# Patient Record
Sex: MALE | Race: WHITE | Employment: FULL TIME | URBAN - METROPOLITAN AREA
[De-identification: names, ages, dates, MRNs, and addresses within clinical notes are randomized per-mention and may not be internally consistent; named-entity substitution may affect disease eponyms.]

---

## 2017-10-11 ENCOUNTER — OFFICE VISIT (OUTPATIENT)
Dept: CARDIOLOGY | Age: 22
End: 2017-10-11
Payer: COMMERCIAL

## 2017-10-11 VITALS
BODY MASS INDEX: 23.4 KG/M2 | DIASTOLIC BLOOD PRESSURE: 77 MMHG | HEART RATE: 62 BPM | OXYGEN SATURATION: 98 % | HEIGHT: 77 IN | WEIGHT: 198.2 LBS | SYSTOLIC BLOOD PRESSURE: 125 MMHG

## 2017-10-11 DIAGNOSIS — I77.810 AORTIC ROOT DILATATION (HCC): ICD-10-CM

## 2017-10-11 DIAGNOSIS — Q87.40 MARFAN'S SYNDROME: Primary | ICD-10-CM

## 2017-10-11 PROCEDURE — 99243 OFF/OP CNSLTJ NEW/EST LOW 30: CPT | Performed by: INTERNAL MEDICINE

## 2017-10-11 PROCEDURE — 93000 ELECTROCARDIOGRAM COMPLETE: CPT | Performed by: INTERNAL MEDICINE

## 2017-10-11 RX ORDER — ATENOLOL 25 MG/1
25 TABLET ORAL DAILY
COMMUNITY
End: 2017-10-16 | Stop reason: SDUPTHER

## 2017-10-11 NOTE — PROGRESS NOTES
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 Had to change cardiologist because of insurance was seeing Dr. Penny Dodge in [facility] last seen him July 2016,echo was done there. EKG done today. Denies any chest pain,pressure,shortness of breath,palpitations,dizziness,lightheadedness,edema or fatigue. Wm Mcallister is 25 y.o. male who presents today for Evaluation and to establish care. Mr. Fly Cuenca has history of probable Marfan syndrome and mild aortic dilatation. Several members of his family has Marfan syndrome, although he has not been genetically tested he was closely followed by [address] [place] [address] for the past several years. He has aortic root dilation with  Aortic root measurement of 40 mm in 2013, his aortic root in 2016 was 41 mm with a Z score of 3.09 (aortic normograms). No aortic valve disease. No significant mitral regurgitation. He is here today to establish care. Largely asymptomatic. Denies any chest pain, pressure or tightness. No significant shortness of breath. No orthopnea or PND. No palpitations, dizziness or lightheadedness. No history of loss of consciousness. He is fairly active with no significant exertional symptoms. He knew that he should not participate in contact sports or heavy lifting. He is nonsmoker, no significant caffeine intake. Occasionally drinking beer 3-4 times per week. No history of loss of drug use. Past Medical History:    Past Medical History:   Diagnosis Date    H/O echocardiogram 06/20/2005    Marfan's syndrome      Past Surgical History:  No past surgical history on file.      Social History:    History   Smoking Status    Never Smoker   Smokeless Tobacco    Never Used     Current Medications:  Outpatient Prescriptions Marked as Taking for the 10/11/17

## 2017-10-11 NOTE — PATIENT INSTRUCTIONS
medicine. · Do not smoke. People with Marfan syndrome are already at risk for heart and lung problems. If you need help quitting, talk to your doctor about stop-smoking programs and medicines. These can increase your chances of quitting for good. · Talk to your doctor before starting an exercise program or playing team sports. Contact sports can be dangerous because your blood vessels and joints are weaker than normal.  · Wear a medical alert bracelet which says that you have Marfan syndrome. You can buy this at most drugstores. · Write or type a list of any conditions you have that are related to Marfan syndrome. Also include a list of medicines you take. Keep these lists in several places, including with you in case of an emergency. When should you call for help? Call 911 anytime you think you may need emergency care. For example, call if:  · You have severe chest, belly, or back pain. · You passed out (lost consciousness). · You have symptoms of a heart attack. These may include:  ¨ Chest pain or pressure, or a strange feeling in the chest.  ¨ Sweating. ¨ Shortness of breath. ¨ Nausea or vomiting. ¨ Pain, pressure, or a strange feeling in the back, neck, jaw, or upper belly or in one or both shoulders or arms. ¨ Lightheadedness or sudden weakness. ¨ A fast or irregular heartbeat. After you call 911, the  may tell you to chew 1 adult-strength or 2 to 4 low-dose aspirin. Wait for an ambulance. Do not try to drive yourself. Call your doctor now or seek immediate medical care if:  · You have upper back or belly pain. · You are coughing or wheezing. · You have a hoarse voice. · You have a pulsing feeling in your belly or back. Watch closely for changes in your health, and be sure to contact your doctor if:  · You have questions about Marfan syndrome. Where can you learn more? Go to [email or web address] and sign in to your [employer] account.  Enter V405 in the [address]

## 2017-10-16 ENCOUNTER — TELEPHONE (OUTPATIENT)
Dept: CARDIOLOGY | Age: 22
End: 2017-10-16

## 2017-10-16 RX ORDER — ATENOLOL 50 MG/1
75 TABLET ORAL DAILY
Qty: 45 TABLET | Refills: 3 | Status: SHIPPED | OUTPATIENT
Start: 2017-10-16 | End: 2018-02-19 | Stop reason: SDUPTHER

## 2017-10-16 NOTE — TELEPHONE ENCOUNTER
LM on mothers VM since she was the one that called for patients refill to let her know that a new Rx was sent to pharm for Dread and that Dr. Kourtney Theodore gave him the 50mg tabs of Atenolol and he should take 1.5 tabs daily

## 2017-10-16 NOTE — TELEPHONE ENCOUNTER
Patient needs refill of his Atenolol to [facility]. Is taking Atenolol 75mg daily. Current dose on med profile is for 25mg tabs taking 3 tabs daily.    Thank you

## 2018-02-19 RX ORDER — ATENOLOL 50 MG/1
75 TABLET ORAL DAILY
Qty: 45 TABLET | Refills: 3 | Status: SHIPPED | OUTPATIENT
Start: 2018-02-19 | End: 2018-06-13 | Stop reason: SDUPTHER

## 2018-06-13 RX ORDER — ATENOLOL 50 MG/1
TABLET ORAL
Qty: 135 TABLET | Refills: 3 | Status: SHIPPED | OUTPATIENT
Start: 2018-06-13 | End: 2019-07-03 | Stop reason: SDUPTHER

## 2019-07-03 RX ORDER — ATENOLOL 50 MG/1
TABLET ORAL
Qty: 135 TABLET | Refills: 0 | Status: SHIPPED | OUTPATIENT
Start: 2019-07-03 | End: 2019-07-12 | Stop reason: SDUPTHER

## 2019-07-12 ENCOUNTER — OFFICE VISIT (OUTPATIENT)
Dept: FAMILY MEDICINE CLINIC | Age: 24
End: 2019-07-12
Payer: COMMERCIAL

## 2019-07-12 VITALS
BODY MASS INDEX: 23.75 KG/M2 | HEIGHT: 76 IN | DIASTOLIC BLOOD PRESSURE: 72 MMHG | WEIGHT: 195 LBS | SYSTOLIC BLOOD PRESSURE: 118 MMHG

## 2019-07-12 DIAGNOSIS — Q87.40 MARFAN'S SYNDROME: Primary | ICD-10-CM

## 2019-07-12 DIAGNOSIS — J30.9 ALLERGIC RHINITIS, UNSPECIFIED SEASONALITY, UNSPECIFIED TRIGGER: ICD-10-CM

## 2019-07-12 PROCEDURE — 99213 OFFICE O/P EST LOW 20 MIN: CPT | Performed by: FAMILY MEDICINE

## 2019-07-12 RX ORDER — ATENOLOL 50 MG/1
TABLET ORAL
Qty: 135 TABLET | Refills: 2 | Status: SHIPPED | OUTPATIENT
Start: 2019-07-12 | End: 2020-05-11 | Stop reason: SDUPTHER

## 2019-07-12 ASSESSMENT — PATIENT HEALTH QUESTIONNAIRE - PHQ9
2. FEELING DOWN, DEPRESSED OR HOPELESS: 0
SUM OF ALL RESPONSES TO PHQ QUESTIONS 1-9: 0
SUM OF ALL RESPONSES TO PHQ9 QUESTIONS 1 & 2: 0
SUM OF ALL RESPONSES TO PHQ QUESTIONS 1-9: 0
1. LITTLE INTEREST OR PLEASURE IN DOING THINGS: 0

## 2019-07-12 NOTE — PROGRESS NOTES
echo completed for Marfan's syndrome. He is agreeable with this and I will call with results. Orders Placed This Encounter   Procedures    ECHO Complete 2D W Doppler W Color     Standing Status:   Future     Standing Expiration Date:   7/11/2020     Order Specific Question:   Reason for exam:     Answer:   marfans syn     Orders Placed This Encounter   Medications    atenolol (TENORMIN) 50 MG tablet     Sig: TAKE 1 & 1/2 (ONE & ONE-HALF) TABLETS BY MOUTH ONCE DAILY     Dispense:  135 tablet     Refill:  2     Please consider 90 day supplies to promote better adherence   I, Dr. Declan Villatoro, personally performed the services described in this documentation as scribed by NIC Kennedy in my presence, and is both accurate and complete.

## 2019-08-16 ENCOUNTER — HOSPITAL ENCOUNTER (OUTPATIENT)
Dept: NON INVASIVE DIAGNOSTICS | Age: 24
Discharge: HOME | End: 2019-08-16
Payer: COMMERCIAL

## 2019-08-16 DIAGNOSIS — Q87.40 MARFAN'S SYNDROME: ICD-10-CM

## 2019-08-16 LAB
LV EF: 53 %
LVEF MODALITY: NORMAL

## 2019-08-16 PROCEDURE — 93306 TTE W/DOPPLER COMPLETE: CPT

## 2020-05-11 RX ORDER — ATENOLOL 50 MG/1
TABLET ORAL
Qty: 90 TABLET | Refills: 0 | Status: SHIPPED | OUTPATIENT
Start: 2020-05-11 | End: 2020-07-07 | Stop reason: SDUPTHER

## 2020-07-01 ENCOUNTER — OFFICE VISIT (OUTPATIENT)
Dept: FAMILY MEDICINE CLINIC | Age: 25
End: 2020-07-01
Payer: COMMERCIAL

## 2020-07-01 VITALS
WEIGHT: 201 LBS | DIASTOLIC BLOOD PRESSURE: 74 MMHG | BODY MASS INDEX: 24.48 KG/M2 | SYSTOLIC BLOOD PRESSURE: 106 MMHG | HEIGHT: 76 IN

## 2020-07-01 PROBLEM — Z00.00 ROUTINE GENERAL MEDICAL EXAMINATION AT A HEALTH CARE FACILITY: Status: ACTIVE | Noted: 2020-07-01

## 2020-07-01 PROBLEM — R53.83 FATIGUE: Status: ACTIVE | Noted: 2020-07-01

## 2020-07-01 PROCEDURE — 99395 PREV VISIT EST AGE 18-39: CPT | Performed by: FAMILY MEDICINE

## 2020-07-01 SDOH — ECONOMIC STABILITY: FOOD INSECURITY: WITHIN THE PAST 12 MONTHS, THE FOOD YOU BOUGHT JUST DIDN'T LAST AND YOU DIDN'T HAVE MONEY TO GET MORE.: NEVER TRUE

## 2020-07-01 SDOH — ECONOMIC STABILITY: FOOD INSECURITY: WITHIN THE PAST 12 MONTHS, YOU WORRIED THAT YOUR FOOD WOULD RUN OUT BEFORE YOU GOT MONEY TO BUY MORE.: NEVER TRUE

## 2020-07-01 SDOH — ECONOMIC STABILITY: INCOME INSECURITY: HOW HARD IS IT FOR YOU TO PAY FOR THE VERY BASICS LIKE FOOD, HOUSING, MEDICAL CARE, AND HEATING?: NOT HARD AT ALL

## 2020-07-01 ASSESSMENT — PATIENT HEALTH QUESTIONNAIRE - PHQ9
1. LITTLE INTEREST OR PLEASURE IN DOING THINGS: 0
SUM OF ALL RESPONSES TO PHQ QUESTIONS 1-9: 0
SUM OF ALL RESPONSES TO PHQ9 QUESTIONS 1 & 2: 0
2. FEELING DOWN, DEPRESSED OR HOPELESS: 0
SUM OF ALL RESPONSES TO PHQ QUESTIONS 1-9: 0

## 2020-07-01 NOTE — PATIENT INSTRUCTIONS
PLAN:  We discuss the episode of chest pain he had a few months ago. He never notices any exertional chest pain. As long as he doesn't experience this and he is not getting other associated symptoms such as dizziness, lightheadedness, palpitations, etc. His ECHO from last year is reviewed. We discuss the importance of getting proper rest.     I encourage him to be mindful of respiratory infections as these lead to inflammation and this can lead to vascular events. He should also avoid heavy lifting that could increase vascular pressure. I would like to get some baseline labs. I will see him back in 1 year or sooner. SURVEY:    You may be receiving a survey from [employer] regarding your visit today. Please complete the survey to enable us to provide the highest quality of care to you and your family. If you cannot score us a very good on any question, please call the office to discuss how we could have made your experience a very good one. Thank you.

## 2020-07-01 NOTE — PROGRESS NOTES
I, FACUNDO Judd, am scribing for and in the presence of Dr. Grant Hester. 07/01/2020 3:46 pm [address]  [address]  [id number]  Dept: [phone]    Dread Mcgill is a 26 y.o. male here for Annual Exam      HPI:  Pt. Presents for annual wellness visit. He sees Dr. Shyann alvaradon. He had an echo done 08/2019. Prior to Admission medications    Medication Sig Start Date End Date Taking? Authorizing Provider   atenolol (TENORMIN) 50 MG tablet TAKE 1 & 1/2 (ONE & ONE-HALF) TABLETS BY MOUTH ONCE DAILY 5/11/20  Yes Grant Hester MD       ROS:  General Constitutional: Denies chills. Denies fever. Denies headache. Denies lightheadedness. Ophthalmologic: Denies blurred vision. ENT: Denies nasal congestion. Denies sore throat. Denies ear pain and pressure. Respiratory: Denies cough. Denies shortness of breath. Denies wheezing. Cardiovascular: Admits chest pain at rest 2 months ago while lying in bed, sharp upper left chest, lasted 1 hour then went away, worse with deep breath. Denies irregular heartbeat. Denies palpitations. Gastrointestinal: Denies abdominal pain. Denies blood in the stool. Denies constipation. Denies diarrhea. Denies nausea. Denies vomiting. Genitourinary: Denies blood in the urine. Denies difficulty urinating. Denies frequent urination. Denies painful urination. Denies urinary incontinence. Musculoskeletal: Denies muscle aches. Denies painful joints. Denies swollen joints. Peripheral Vascular: Denies pain/cramping in legs after exertion. Skin: Denies dry skin. Denies itching. Denies rash. Neurologic: Denies falls. Denies dizziness. Denies fainting. Denies tingling/numbness. Psychiatric: Denies sleep disturbance. Denies anxiety. Denies depressed mood. No past surgical history on file.     Family History   Problem Relation Age of Onset    Marfan Syndrome Mother     Marfan Syndrome Sister     Heart Defect Sister        Past Medical History:   Diagnosis Date    H/O echocardiogram 06/20/2005    Marfan's syndrome       Social History     Tobacco Use    Smoking status: Never Smoker    Smokeless tobacco: Never Used   Substance Use Topics    Alcohol use: Yes      Current Outpatient Medications   Medication Sig Dispense Refill    atenolol (TENORMIN) 50 MG tablet TAKE 1 & 1/2 (ONE & ONE-HALF) TABLETS BY MOUTH ONCE DAILY 90 tablet 0     No current facility-administered medications for this visit. No Known Allergies    PHYSICAL EXAM:    /74   Ht 6' 3.5\" (1.918 m)   Wt 201 lb (91.2 kg)   BMI 24.79 kg/m²   Wt Readings from Last 3 Encounters:   07/01/20 201 lb (91.2 kg)   07/12/19 195 lb (88.5 kg)   10/11/17 198 lb 3.2 oz (89.9 kg)     BP Readings from Last 3 Encounters:   07/01/20 106/74   07/12/19 118/72   10/11/17 125/77       General Appearance: in no acute distress, well developed, well nourished. Eyes: pupils equal, round reactive to light and accommodation. Ears: normal canal and TM's. Nose: nares patent, no lesions. Oral Cavity: mucosa moist.  Throat: clear. Neck/Thyroid: neck supple, full range of motion, no cervical lymphadenopathy, no thyromegaly or carotid bruits. Skin: warm and dry. No suspicious lesions. Heart: regular rate and rhythm. No murmurs. S1, S2 normal, no gallops. Rate: 60   Lungs: clear to auscultation bilaterally. Abdomen: bowel sounds present, soft, nontender, nondistended, no masses or organomegaly. Musculoskeletal: normal, full range of motion in knees and hips, no swelling or tenderness. Extremities: no cyanosis or edema. Peripheral Pulses: 2+ throughout, symetric. Neurologic: nonfocal, motor strength normal upper and lower extremities, sensory exam intact. Psych: normal affect, speech fluent. ASSESSMENT:   Diagnosis Orders   1. Routine general medical examination at a health care facility  ALT    AST    Basic Metabolic Panel    CBC    Lipid Panel   2. Marfan's syndrome     3. Fatigue, unspecified type         PLAN:  We discuss the episode of chest pain he had a few months ago. He never notices any exertional chest pain. As long as he doesn't experience this and he is not getting other associated symptoms such as dizziness, lightheadedness, palpitations, etc. His ECHO from last year is reviewed. We discuss the importance of getting proper rest.     I encourage him to be mindful of respiratory infections as these lead to inflammation and this can lead to vascular events. He should also avoid heavy lifting that could increase vascular pressure. I would like to get some baseline labs. I will see him back in 1 year or sooner. Orders Placed This Encounter   Procedures    ALT     Standing Status:   Future     Standing Expiration Date:   7/1/2021    AST     Standing Status:   Future     Standing Expiration Date:   7/1/2021    Basic Metabolic Panel     Standing Status:   Future     Standing Expiration Date:   7/1/2021    CBC     Standing Status:   Future     Standing Expiration Date:   7/1/2021    Lipid Panel     Standing Status:   Future     Standing Expiration Date:   7/1/2021     Order Specific Question:   Is Patient Fasting?/# of Hours     Answer:   no fasting     No orders of the defined types were placed in this encounter. I, Dr. Kris Woodward, personally performed the services described in this documentation as scribed by FACUNDO Schmidt in my presence, and is both accurate and complete.

## 2020-07-07 RX ORDER — ATENOLOL 50 MG/1
TABLET ORAL
Qty: 135 TABLET | Refills: 3 | Status: SHIPPED | OUTPATIENT
Start: 2020-07-07 | End: 2021-07-07

## 2020-07-31 PROBLEM — Z00.00 ROUTINE GENERAL MEDICAL EXAMINATION AT A HEALTH CARE FACILITY: Status: RESOLVED | Noted: 2020-07-01 | Resolved: 2020-07-31

## 2020-09-02 ENCOUNTER — HOSPITAL ENCOUNTER (OUTPATIENT)
Dept: LAB | Age: 25
Setting detail: SPECIMEN
Discharge: HOME | End: 2020-09-02
Payer: COMMERCIAL

## 2020-09-02 ENCOUNTER — OFFICE VISIT (OUTPATIENT)
Dept: FAMILY MEDICINE CLINIC | Age: 25
End: 2020-09-02
Payer: COMMERCIAL

## 2020-09-02 VITALS — TEMPERATURE: 99.2 F | DIASTOLIC BLOOD PRESSURE: 84 MMHG | SYSTOLIC BLOOD PRESSURE: 132 MMHG | HEART RATE: 60 BPM

## 2020-09-02 PROCEDURE — U0003 INFECTIOUS AGENT DETECTION BY NUCLEIC ACID (DNA OR RNA); SEVERE ACUTE RESPIRATORY SYNDROME CORONAVIRUS 2 (SARS-COV-2) (CORONAVIRUS DISEASE [COVID-19]), AMPLIFIED PROBE TECHNIQUE, MAKING USE OF HIGH THROUGHPUT TECHNOLOGIES AS DESCRIBED BY CMS-2020-01-R: HCPCS

## 2020-09-02 PROCEDURE — 99213 OFFICE O/P EST LOW 20 MIN: CPT | Performed by: NURSE PRACTITIONER

## 2020-09-02 PROCEDURE — C9803 HOPD COVID-19 SPEC COLLECT: HCPCS

## 2020-09-02 ASSESSMENT — ENCOUNTER SYMPTOMS
VOMITING: 0
COUGH: 1
WHEEZING: 0
CONSTIPATION: 0
CHEST TIGHTNESS: 0
SORE THROAT: 1
SHORTNESS OF BREATH: 0
ABDOMINAL PAIN: 0
SINUS PAIN: 1
EYE PAIN: 1
RHINORRHEA: 1
EYE ITCHING: 0
NAUSEA: 0
DIARRHEA: 0
SINUS PRESSURE: 1
ABDOMINAL DISTENTION: 0

## 2020-09-02 NOTE — PROGRESS NOTES
Name: River Gonzalez  : 1995         Chief Complaint:     Chief Complaint   Patient presents with    Cough    Fever    Other     nasal drainage    Other     postnasal drip       History of Present Illness:      River Gonzalez is a 26 y.o.  male who presents with Cough; Fever; Other (nasal drainage); and Other (postnasal drip)      HPI   The patient presents today for symptoms of fever, cough, nasal drainage, and postnasal drip. He admits a history of seasonal allergies for which he takes OTC allergy medication. The allergy medication is successful in \"taking the edge off\" of his allergy symptoms. Three days ago, he reports being more fatigued than normal. Yesterday, he reports worsening of cough, fever, nasal drainage, and postnasal drip. This morning he went to work at 5:45am and felt \"warm\". He took his temperature at 6AM and it was 101 F. He then left work and went home, his temperature at home was 100.4 F and then 99.6 F with the repeat. He admits new onset headache this morning and took excedrin for his symptoms. The cough is productive, admits phlegm, patient unsure of the color. He denies persistent cough. He admits that his symptoms do not feel worse than his usual allergies, aside from new onset fever and fatigue. He denies sick contacts. He got  recently and had a wedding last weekend. He states that his wife's grandmother had a fever yesterday, but has since resolved. He denies being within 6 feet of the grandmother. The patient admits left arm lesion that occurred 1 month ago. He questions if it was from an ingrown hair. He \"picked at\" the lesion twice, the last time being 2 weeks ago. He admits the lesion has since \"closed up\". Yesterday, the lesion was red, painful, and raised. Today, he states that it is better and less raised, red, and painful.        Past Medical History:     Past Medical History:   Diagnosis Date    H/O echocardiogram 2005    Marfan's syndrome Reviewed all health maintenance requirements and ordered appropriate tests  Health Maintenance Due   Topic Date Due    Varicella vaccine (1 of 2 - 2-dose childhood series) 02/23/1997    HPV vaccine (1 - Male 2-dose series) 02/23/2007    HIV screen  02/23/2011    DTaP/Tdap/Td vaccine (1 - Tdap) 02/23/2015    Flu vaccine (1) 09/01/2020       Past Surgical History:     History reviewed. No pertinent surgical history. Medications:       Prior to Admission medications    Medication Sig Start Date End Date Taking? Authorizing Provider   atenolol (TENORMIN) 50 MG tablet TAKE 1 & 1/2 (ONE & ONE-HALF) TABLETS BY MOUTH ONCE DAILY 7/7/20  Yes Pat Payne MD        Allergies:       Patient has no known allergies. Social History:     Tobacco:    reports that he has never smoked. He has never used smokeless tobacco.  Alcohol:      reports current alcohol use. Drug Use:  reports no history of drug use. Family History:     Family History   Problem Relation Age of Onset    Marfan Syndrome Mother     Pacemaker Father     Marfan Syndrome Sister     Heart Defect Sister        Review of Systems:     Positive and Negative as described in HPI    Review of Systems   Constitutional: Positive for appetite change (Admits he has not been as hungry within the last 3 days. He has not eaten as much as he usually does.), chills, fatigue (exhausted) and fever. Denies loss of taste or smell   HENT: Positive for ear pain, postnasal drip, rhinorrhea, sinus pressure, sinus pain and sore throat. Negative for congestion. Eyes: Positive for pain (Admits right eye pain. ). Negative for itching. Respiratory: Positive for cough. Negative for chest tightness, shortness of breath and wheezing. Cardiovascular: Negative for chest pain and palpitations. Gastrointestinal: Negative for abdominal distention, abdominal pain, constipation, diarrhea, nausea and vomiting. Genitourinary: Negative for difficulty urinating. Musculoskeletal: Negative for arthralgias and myalgias. Skin: Negative for rash. Neurological: Positive for headaches (Admits headaches that do not occur very often. ). Negative for dizziness and light-headedness. Physical Exam:   Vitals:  /84 (Site: Left Upper Arm, Position: Sitting)   Pulse 60   Temp 99.2 °F (37.3 °C) (Temporal)     Physical Exam  Constitutional:       General: He is not in acute distress. Appearance: Normal appearance. He is normal weight. He is not ill-appearing. HENT:      Head: Normocephalic. Comments: Salivary glands enlarged L > R     Right Ear: Tympanic membrane, ear canal and external ear normal.      Left Ear: Tympanic membrane, ear canal and external ear normal.      Nose:      Comments: Green drainage present in right nostril. Turbinates pale and enlarged bilaterally. Mouth/Throat:      Comments: Posterior oropharynx erythematous. +PND. Tonsils 1+ bilaterally. No tonsillar exudate or erythema. Eyes:      General:         Right eye: No discharge. Left eye: No discharge. Extraocular Movements: Extraocular movements intact. Conjunctiva/sclera: Conjunctivae normal.      Pupils: Pupils are equal, round, and reactive to light. Neck:      Musculoskeletal: Neck supple. Cardiovascular:      Rate and Rhythm: Normal rate and regular rhythm. Pulses: Normal pulses. Heart sounds: Normal heart sounds. No murmur. Comments: HR 60  Pulmonary:      Effort: Pulmonary effort is normal. No respiratory distress. Breath sounds: Normal breath sounds. No stridor. No wheezing or rales. Comments: No coughing during patient visit. Breath sounds clear in upper and lower lungs bilaterally, no adventitious breath sounds. Abdominal:      General: Abdomen is flat. There is no distension. Palpations: Abdomen is soft. There is no mass. Tenderness: There is no abdominal tenderness. There is no guarding.       Hernia: No hernia is present. Comments: Bowel sounds hyperactive in all 4 quadrants   Musculoskeletal:      Right lower leg: No edema. Left lower leg: No edema. Skin:     General: Skin is warm. Comments: 1cm, pink ulceration present on left forearm. No surrounding erythema. No drainage or bleeding. Neurological:      Mental Status: He is alert and oriented to person, place, and time. Psychiatric:         Mood and Affect: Mood normal.         Behavior: Behavior normal.         Thought Content: Thought content normal.         Judgment: Judgment normal.         Data:     No results found for: NA, K, CL, CO2, BUN, CREATININE, GLUCOSE, PROT, LABALBU, BILITOT, ALKPHOS, AST, ALT  No results found for: WBC, RBC, HGB, HCT, MCV, MCH, MCHC, RDW, PLT, MPV  No results found for: TSH  No results found for: CHOL, HDL, PSA, LABA1C    Assessment/Plan:      Diagnosis Orders   1. Cough  COVID-19 Ambulatory   2. Fever, unspecified fever cause  COVID-19 Ambulatory       Plan:  - Patient celebrated his wedding last week. Due to his symptoms of fever and cough, as well as recent exposure to crowds, I would like for him to be tested for covid-19. Patient agreed to this plan. He was instructed to quarantine at home until called with results. - Discussed with the patient that I believe his symptoms may be viral in nature. We discussed the role of antibiotics in viral versus bacterial infections. Explained why a cough suppressant is not warranted at this time with a productive cough. He is not having SOB, I will not prescribe an inhaler at this time. He has a history of marfan's syndrome, described why decongestants are not a safe option. His lungs are clear to ausculation and he is not ill-appearing, I will monitor his cough and fever and consider a chest xray in the future, but do not believe it is warranted today. - Encouraged adequate rest and hydration.  He may use Excedrin for his migraines, OTC allergy medication, and tylenol as

## 2020-09-02 NOTE — PATIENT INSTRUCTIONS
SURVEY:    You may be receiving a survey from [employer] regarding your visit today. Please complete the survey to enable us to provide the highest quality of care to you and your family. If you are unable to score a \"very good' on any question, please call the office to discuss how we can improve your experience. We appreciate your feedback. Thank you! Plan:  - Patient celebrated his wedding last week. Due to his symptoms of fever and cough, as well as recent exposure to crowds, I would like for him to be tested for covid-19. Patient agreed to this plan. He was instructed to quarantine at home until called with results. - Discussed with the patient that I believe his symptoms may be viral in nature. We discussed the role of antibiotics in viral versus bacterial infections. He states that his cough is productive, I explained why a cough suppressant is not warranted at this time. He is not having SOB, I will not prescribe an inhaler at this time. He has a history of marfan's syndrome, I described why decongestants are not a safe option. His lungs sound clear on ausculation and he is not ill-appearing, I will monitor his cough and fever and consider a chest xray as necessary in the future but do not believe it is warranted today. - Encouraged adequate rest and hydration. He may use Excedrin for his migraines, OTC allergy medication as it has helped his allergy symptoms in the past, and tylenol as needed for fever.   - He is to monitor his symptoms closely and alert the office if his symptoms worsen or persist. Patient to monitor his temperature three times a day. If the patient experiences a fever > 103 F or worsening SOB, he should report to the ED. - I will call the patient with results and alter treatment plan as necessary. Arm Lesion:   - I discussed with the patient that his arm lesion does not look infected. Signs of infection reviewed.  He should use OTC triple abx ointment on the lesion and keep it covered with a bandaid until healed. Patient to follow up if lesion worsens or persists or signs and symptoms of infection occur.

## 2020-09-04 LAB — SARS-COV-2, NAA: DETECTED

## 2020-09-05 ENCOUNTER — TELEPHONE (OUTPATIENT)
Dept: PRIMARY CARE CLINIC | Age: 25
End: 2020-09-05

## 2020-09-05 NOTE — TELEPHONE ENCOUNTER
Called and informed patient of Positive covid test.  Patient stated understanding, and stated that he is feeling better.

## 2020-09-14 ENCOUNTER — TELEPHONE (OUTPATIENT)
Dept: FAMILY MEDICINE CLINIC | Age: 25
End: 2020-09-14

## 2020-09-14 NOTE — LETTER
[facility] [address]  Phone: [phone]  Fax: 383 [facility]        September 14, 2020     Patient: Huseyin Shoulder   YOB: 1995   Date of Visit: 09/02/2020       To Whom it May Concern:    Dread Marshall was seen in my clinic on 09/02/2020. He may return to work on 09/14/2020. He was tested POSITIVE for COVID-19 on 09/02/2020 and had been instructed to quarantine for 10 days following. If you have any questions or concerns, please don't hesitate to call.     Sincerely,          Janet Cardenas, CNP

## 2020-09-14 NOTE — LETTER
[facility] [address]  Phone: [phone]  Fax: [phone] [facility]        September 14, 2020     Patient: Baron Khan   YOB: 1995   Date of Visit: 9/14/2020       To Whom it May Concern:    Dread Gilbert was seen in my clinic on 9/2/2020. He may return to work on 09/14/2020. He was tested positive for COVID-19 on 9/4/2020 and instructed to quarantine for 10 days following. If you have any questions or concerns, please don't hesitate to call.     Sincerely,       Gemini Reynaga, CNP

## 2020-09-14 NOTE — TELEPHONE ENCOUNTER
Patient was tested positive for COVID on 9/2/20. Returning to work today, needs a return to work slip. Does he need to come in and be seen? If able to just e-mail a slip this is the address. [email or web address]

## 2021-07-07 ENCOUNTER — HOSPITAL ENCOUNTER (OUTPATIENT)
Age: 26
Discharge: HOME | End: 2021-07-07
Payer: COMMERCIAL

## 2021-07-07 ENCOUNTER — OFFICE VISIT (OUTPATIENT)
Dept: FAMILY MEDICINE CLINIC | Age: 26
End: 2021-07-07
Payer: COMMERCIAL

## 2021-07-07 VITALS
BODY MASS INDEX: 24.94 KG/M2 | DIASTOLIC BLOOD PRESSURE: 72 MMHG | SYSTOLIC BLOOD PRESSURE: 120 MMHG | WEIGHT: 204.8 LBS | HEIGHT: 76 IN

## 2021-07-07 DIAGNOSIS — Q87.40 MARFAN'S SYNDROME: Primary | ICD-10-CM

## 2021-07-07 DIAGNOSIS — Z00.00 ROUTINE GENERAL MEDICAL EXAMINATION AT A HEALTH CARE FACILITY: ICD-10-CM

## 2021-07-07 LAB
ALT SERPL-CCNC: 17 U/L (ref 5–41)
ANION GAP SERPL CALCULATED.3IONS-SCNC: 10 MMOL/L (ref 9–17)
AST SERPL-CCNC: 17 U/L
BUN BLDV-MCNC: 14 MG/DL (ref 6–20)
BUN/CREAT BLD: 14 (ref 9–20)
CALCIUM SERPL-MCNC: 9.7 MG/DL (ref 8.6–10.4)
CHLORIDE BLD-SCNC: 101 MMOL/L (ref 98–107)
CHOLESTEROL/HDL RATIO: 3.4
CHOLESTEROL: 158 MG/DL
CO2: 26 MMOL/L (ref 20–31)
CREAT SERPL-MCNC: 1 MG/DL (ref 0.7–1.2)
GFR AFRICAN AMERICAN: >60 ML/MIN
GFR NON-AFRICAN AMERICAN: >60 ML/MIN
GFR SERPL CREATININE-BSD FRML MDRD: NORMAL ML/MIN/{1.73_M2}
GFR SERPL CREATININE-BSD FRML MDRD: NORMAL ML/MIN/{1.73_M2}
GLUCOSE BLD-MCNC: 90 MG/DL (ref 70–99)
HCT VFR BLD CALC: 45.4 % (ref 40.7–50.3)
HDLC SERPL-MCNC: 47 MG/DL
HEMOGLOBIN: 15.4 G/DL (ref 13–17)
LDL CHOLESTEROL: 91 MG/DL (ref 0–130)
MCH RBC QN AUTO: 29.8 PG (ref 25.2–33.5)
MCHC RBC AUTO-ENTMCNC: 33.9 G/DL (ref 28.4–34.8)
MCV RBC AUTO: 88 FL (ref 82.6–102.9)
NRBC AUTOMATED: 0 PER 100 WBC
PDW BLD-RTO: 12.9 % (ref 11.8–14.4)
PLATELET # BLD: 176 K/UL (ref 138–453)
PMV BLD AUTO: 9.6 FL (ref 8.1–13.5)
POTASSIUM SERPL-SCNC: 4.7 MMOL/L (ref 3.7–5.3)
RBC # BLD: 5.16 M/UL (ref 4.21–5.77)
SODIUM BLD-SCNC: 137 MMOL/L (ref 135–144)
TRIGL SERPL-MCNC: 100 MG/DL
VLDLC SERPL CALC-MCNC: NORMAL MG/DL (ref 1–30)
WBC # BLD: 5.2 K/UL (ref 3.5–11.3)

## 2021-07-07 PROCEDURE — 85027 COMPLETE CBC AUTOMATED: CPT

## 2021-07-07 PROCEDURE — 84450 TRANSFERASE (AST) (SGOT): CPT

## 2021-07-07 PROCEDURE — 36415 COLL VENOUS BLD VENIPUNCTURE: CPT

## 2021-07-07 PROCEDURE — 84460 ALANINE AMINO (ALT) (SGPT): CPT

## 2021-07-07 PROCEDURE — 99395 PREV VISIT EST AGE 18-39: CPT | Performed by: FAMILY MEDICINE

## 2021-07-07 PROCEDURE — 80061 LIPID PANEL: CPT

## 2021-07-07 PROCEDURE — 80048 BASIC METABOLIC PNL TOTAL CA: CPT

## 2021-07-07 RX ORDER — ATENOLOL 50 MG/1
50 TABLET ORAL DAILY
Qty: 90 TABLET | Refills: 3 | Status: SHIPPED | OUTPATIENT
Start: 2021-07-07 | End: 2022-08-11 | Stop reason: SDUPTHER

## 2021-07-07 SDOH — ECONOMIC STABILITY: FOOD INSECURITY: WITHIN THE PAST 12 MONTHS, YOU WORRIED THAT YOUR FOOD WOULD RUN OUT BEFORE YOU GOT MONEY TO BUY MORE.: NEVER TRUE

## 2021-07-07 SDOH — ECONOMIC STABILITY: FOOD INSECURITY: WITHIN THE PAST 12 MONTHS, THE FOOD YOU BOUGHT JUST DIDN'T LAST AND YOU DIDN'T HAVE MONEY TO GET MORE.: NEVER TRUE

## 2021-07-07 ASSESSMENT — PATIENT HEALTH QUESTIONNAIRE - PHQ9
1. LITTLE INTEREST OR PLEASURE IN DOING THINGS: 0
2. FEELING DOWN, DEPRESSED OR HOPELESS: 0
SUM OF ALL RESPONSES TO PHQ QUESTIONS 1-9: 0
SUM OF ALL RESPONSES TO PHQ QUESTIONS 1-9: 0
SUM OF ALL RESPONSES TO PHQ9 QUESTIONS 1 & 2: 0
SUM OF ALL RESPONSES TO PHQ QUESTIONS 1-9: 0

## 2021-07-07 ASSESSMENT — SOCIAL DETERMINANTS OF HEALTH (SDOH): HOW HARD IS IT FOR YOU TO PAY FOR THE VERY BASICS LIKE FOOD, HOUSING, MEDICAL CARE, AND HEATING?: NOT HARD AT ALL

## 2021-07-07 NOTE — PROGRESS NOTES
FACUNDO Faust, am scribing for and in the presence of Dr. Jase Davis. 7/7/21/4:00pm/SNP    [address] [address]  [phone]  Dept: [phone]    Chuckie Vega is a 23 y.o. male here for Wellness Program    HPI:  Patient presents today for a wellness. Prior to Admission medications    Medication Sig Start Date End Date Taking? Authorizing Provider   atenolol (TENORMIN) 50 MG tablet Take 1 tablet by mouth daily 7/7/21  Yes Jase Davis MD     ROS:  General Constitutional: Denies chills. Denies fever. Denies headache. Denies lightheadedness. Ophthalmologic: Denies blurred vision. ENT: Denies nasal congestion. Denies sore throat. Denies ear pain and pressure. Respiratory: Denies cough. Denies shortness of breath. Denies wheezing. Cardiovascular: Denies chest pain at rest. Denies irregular heartbeat. Denies palpitations. Gastrointestinal: Denies abdominal pain. Denies blood in the stool. Denies constipation. Denies diarrhea. Denies nausea. Denies vomiting. Genitourinary: Denies blood in the urine. Denies difficulty urinating. Denies frequent urination. Denies painful urination. Denies urinary incontinence. Musculoskeletal: Denies muscle aches. Denies painful joints. Denies swollen joints. Peripheral Vascular: Denies pain/cramping in legs after exertion. Skin: Denies dry skin. Denies itching. Denies rash. Neurologic: Denies falls. Denies dizziness. Denies fainting. Denies tingling/numbness. Psychiatric: Denies sleep disturbance. Denies anxiety. Denies depressed mood. History reviewed. No pertinent surgical history.     Family History   Problem Relation Age of Onset    Marfan Syndrome Mother     Pacemaker Father     Marfan Syndrome Sister     Heart Defect Sister        Past Medical History:   Diagnosis Date    H/O echocardiogram 06/20/2005    Marfan's syndrome       Social History     Tobacco Use    Smoking status: Never Smoker  Smokeless tobacco: Never Used   Substance Use Topics    Alcohol use: Yes      Current Outpatient Medications   Medication Sig Dispense Refill    atenolol (TENORMIN) 50 MG tablet Take 1 tablet by mouth daily 90 tablet 3     No current facility-administered medications for this visit. No Known Allergies    PHYSICAL EXAM:    /72 (Site: Left Upper Arm, Position: Sitting, Cuff Size: Large Adult)   Ht 6' 3.5\" (1.918 m)   Wt 204 lb 12.8 oz (92.9 kg)   BMI 25.26 kg/m²   Wt Readings from Last 3 Encounters:   07/07/21 204 lb 12.8 oz (92.9 kg)   07/01/20 201 lb (91.2 kg)   07/12/19 195 lb (88.5 kg)     BP Readings from Last 3 Encounters:   07/07/21 120/72   09/02/20 132/84   07/01/20 106/74     General Appearance: in no acute distress, well developed, well nourished. Eyes: pupils equal, round reactive to light and accommodation. Ears: normal canal and TM's. Nose: nares patent, no lesions. Oral Cavity: mucosa moist.  Throat: clear. Neck/Thyroid: neck supple, full range of motion, no cervical lymphadenopathy, no thyromegaly or carotid bruits. Skin: warm and dry. No suspicious lesions. Heart: regular rate and rhythm. No murmurs. S1, S2 normal, no gallops. Rate 50  Lungs: clear to auscultation bilaterally. Abdomen: bowel sounds present, soft, nontender, nondistended, no masses or organomegaly. Musculoskeletal: normal, full range of motion in knees and hips, no swelling or tenderness. Extremities: no cyanosis or edema. Peripheral Pulses: 2+ throughout, symetric. Neurologic: nonfocal, motor strength normal upper and lower extremities, sensory exam intact. Psych: normal affect, speech fluent. ASSESSMENT:   Diagnosis Orders   1. Marfan's syndrome  ECHO Complete 2D W Doppler W Color   2.  Routine general medical examination at a health care facility  Lipid Panel    CBC    Basic Metabolic Panel    AST    ALT    atenolol (TENORMIN) 50 MG tablet     PLAN:  I reviewed his most recent ECHO, I will order a repeat study and call with results.      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   He is careful not to lift heavy or strain heavily    Orders Placed This Encounter   Procedures    Lipid Panel     Standing Status:   Future     Number of Occurrences:   1     Standing Expiration Date:   7/7/2022     Order Specific Question:   Is Patient Fasting?/# of Hours     Answer:   0    CBC     Standing Status:   Future     Number of Occurrences:   1     Standing Expiration Date:   7/7/2022    Basic Metabolic Panel     Standing Status:   Future     Number of Occurrences:   1     Standing Expiration Date:   7/7/2022    AST     Standing Status:   Future     Number of Occurrences:   1     Standing Expiration Date:   7/7/2022    ALT     Standing Status:   Future     Number of Occurrences:   1     Standing Expiration Date:   7/7/2022    ECHO Complete 2D W Doppler W Color     Standing Status:   Future     Standing Expiration Date:   7/7/2022     Order Specific Question:   Reason for exam:     Answer:   marfans syndrome     Orders Placed This Encounter   Medications    atenolol (TENORMIN) 50 MG tablet     Sig: Take 1 tablet by mouth daily     Dispense:  90 tablet     Refill:  3     Please consider 90 day supplies to promote better adherence

## 2021-07-07 NOTE — PATIENT INSTRUCTIONS
SURVEY:    You may be receiving a survey from [employer] regarding your visit today. Please complete the survey to enable us to provide the highest quality of care to you and your family. If you cannot score us a very good on any question, please call the office to discuss how we could of made your experience a very good one. Thank you. PLAN:  I reviewed his most recent ECHO, I will order a repeat study and call with results.      I will have him decrease atenolol to 1 tab daily due to his low heart rate

## 2021-07-20 ENCOUNTER — HOSPITAL ENCOUNTER (OUTPATIENT)
Dept: NON INVASIVE DIAGNOSTICS | Age: 26
Discharge: HOME | End: 2021-07-20
Payer: COMMERCIAL

## 2021-07-20 DIAGNOSIS — Q87.40 MARFAN'S SYNDROME: ICD-10-CM

## 2021-07-20 LAB
LV EF: 53 %
LVEF MODALITY: NORMAL

## 2021-07-20 PROCEDURE — 93306 TTE W/DOPPLER COMPLETE: CPT

## 2021-08-12 ENCOUNTER — TELEPHONE (OUTPATIENT)
Dept: FAMILY MEDICINE CLINIC | Age: 26
End: 2021-08-12

## 2021-08-12 NOTE — TELEPHONE ENCOUNTER
----- Message from Reggie Koyanagi sent at 8/12/2021  8:53 AM EDT -----  Subject: Appointment Request    Reason for Call: Urgent Mental Health    QUESTIONS  Type of Appointment? Established Patient  Reason for appointment request? No appointments available during search  Additional Information for Provider? patient wants to be seen for   recommendations of anxiety meds, the appointments presented were   accommodating to the patient   ---------------------------------------------------------------------------  --------------  CALL BACK INFO  What is the best way for the office to contact you? OK to leave message on   voicemail  Preferred Call Back Phone Number? [phone]  ---------------------------------------------------------------------------  --------------  SCRIPT ANSWERS  Relationship to Patient? Self  Are you having thoughts of hurting yourself or others? No  Are you seeing or hearing things that may not be real (present)? No  Do you think other people are trying to hurt or target you? No  Are you feeling sick because you have not used drugs or alcohol recently? No  Are you feeling sick because of using drugs or alcohol recently? No  Are you having depressed feelings? No  Are you suffering from anxiety or panic attacks? Yes  Have you been diagnosed with, awaiting test results for, or told that you   are suspected of having COVID-19 (Coronavirus)? (If patient has tested   negative or was tested as a requirement for work, school, or travel and   not based on symptoms, answer no)? No  Do you currently have flu-like symptoms including fever or chills, cough,   shortness of breath, difficulty breathing, or new loss of taste or smell? No  Have you had close contact with someone with COVID-19 in the last 14 days? No  (Service Expert  click yes below to proceed with [employer] As Usual   Scheduling)?  Yes

## 2021-08-13 ENCOUNTER — OFFICE VISIT (OUTPATIENT)
Dept: FAMILY MEDICINE CLINIC | Age: 26
End: 2021-08-13
Payer: COMMERCIAL

## 2021-08-13 VITALS
BODY MASS INDEX: 23.26 KG/M2 | HEART RATE: 62 BPM | OXYGEN SATURATION: 97 % | WEIGHT: 197 LBS | DIASTOLIC BLOOD PRESSURE: 65 MMHG | HEIGHT: 77 IN | SYSTOLIC BLOOD PRESSURE: 120 MMHG

## 2021-08-13 DIAGNOSIS — F41.9 ANXIETY: Primary | ICD-10-CM

## 2021-08-13 PROCEDURE — 99213 OFFICE O/P EST LOW 20 MIN: CPT | Performed by: NURSE PRACTITIONER

## 2021-08-13 RX ORDER — ESCITALOPRAM OXALATE 10 MG/1
TABLET ORAL
Qty: 30 TABLET | Refills: 1 | Status: SHIPPED | OUTPATIENT
Start: 2021-08-13 | End: 2021-09-03 | Stop reason: ALTCHOICE

## 2021-08-13 ASSESSMENT — PATIENT HEALTH QUESTIONNAIRE - PHQ9
SUM OF ALL RESPONSES TO PHQ QUESTIONS 1-9: 0
SUM OF ALL RESPONSES TO PHQ9 QUESTIONS 1 & 2: 0
SUM OF ALL RESPONSES TO PHQ QUESTIONS 1-9: 0
2. FEELING DOWN, DEPRESSED OR HOPELESS: 0
1. LITTLE INTEREST OR PLEASURE IN DOING THINGS: 0
SUM OF ALL RESPONSES TO PHQ QUESTIONS 1-9: 0

## 2021-08-13 NOTE — PATIENT INSTRUCTIONS
SURVEY:    You may be receiving a survey from [employer] regarding your visit today. Please complete the survey to enable us to provide the highest quality of care to you and your family. If you cannot score us a very good on any question, please call the office to discuss how we could of made your experience a very good one. Thank you.

## 2021-08-13 NOTE — PROGRESS NOTES
Name: Lul Antonio  : 1995         Chief Complaint:     Chief Complaint   Patient presents with    Anxiety     patient comes in for anxiety. patient recently had . feels worried, chest pain, \"stomach turing\". states he had slight anxiety in past, but now increasing. History of Present Illness:      Lul Antonio is a 23 y.o.  male who presents with Anxiety (patient comes in for anxiety. patient recently had . feels worried, chest pain, \"stomach turing\". states he had slight anxiety in past, but now increasing. )      HPI    The patient presents with complaints of anxiety. He admits decreased appetite, chest pain, \"nervous\" bowel movements, and \"stomach churning\". He admits increased worrying related to his  child that was born this week and redoing his house. He states that he has always had anxiety in the past. Admits worsening anxiety within the last week since his son was born. This is his first child. He denies being on anxiety medication in the past. Denies low, sad, depressed thoughts. Past Medical History:     Past Medical History:   Diagnosis Date    H/O echocardiogram 2005    Marfan's syndrome       Reviewed all health maintenance requirements and ordered appropriate tests  There are no preventive care reminders to display for this patient. Past Surgical History:     No past surgical history on file. Medications:       Prior to Admission medications    Medication Sig Start Date End Date Taking? Authorizing Provider   escitalopram (LEXAPRO) 10 MG tablet Take 1/2 tablet by mouth once daily x4 days then increase to 1 tablet by mouth once daily 21  Yes KAMRYN Ochoa - CNP   atenolol (TENORMIN) 50 MG tablet Take 1 tablet by mouth daily 21  Yes Elizabeth Chapman MD        Allergies:       Patient has no known allergies. Social History:     Tobacco:    reports that he has never smoked.  He has never used smokeless tobacco.  Alcohol: Diagnosis Orders   1. Anxiety         -DONELL-7 completed in office and positive  -Initiate Lexapro 5 mg x 4 days then increase to 10 mg daily  -Reviewed side effects of Lexapro  -Discussed that this medication may cause thoughts of hurting himself or others. This is a medical emergency and he was instructed to report to the emergency department and discontinue this medication immediately. -Reviewed stress relieving techniques   -Follow-up 3 weeks or sooner symptoms worsen or persist    Completed Refills   Requested Prescriptions     Signed Prescriptions Disp Refills    escitalopram (LEXAPRO) 10 MG tablet 30 tablet 1     Sig: Take 1/2 tablet by mouth once daily x4 days then increase to 1 tablet by mouth once daily       No orders of the defined types were placed in this encounter. No results found for this visit on 08/13/21. Return in about 3 weeks (around 9/3/2021), or if symptoms worsen or fail to improve, for anxiety f/u.     Electronically signed by KAMRYN Leos CNP on 08/13/21 at 9:09 PM.

## 2021-09-03 ENCOUNTER — TELEMEDICINE (OUTPATIENT)
Dept: FAMILY MEDICINE CLINIC | Age: 26
End: 2021-09-03
Payer: COMMERCIAL

## 2021-09-03 DIAGNOSIS — F41.9 ANXIETY: Primary | ICD-10-CM

## 2021-09-03 PROCEDURE — 99422 OL DIG E/M SVC 11-20 MIN: CPT | Performed by: NURSE PRACTITIONER

## 2021-09-03 RX ORDER — ESCITALOPRAM OXALATE 10 MG/1
10 TABLET ORAL DAILY
Qty: 90 TABLET | Refills: 3 | Status: SHIPPED | OUTPATIENT
Start: 2021-09-03 | End: 2022-08-11 | Stop reason: SDUPTHER

## 2021-09-03 NOTE — PATIENT INSTRUCTIONS
SURVEY:    You may be receiving a survey from [employer] regarding your visit today. Please complete the survey to enable us to provide the highest quality of care to you and your family. If you cannot score us a very good on any question, please call the office to discuss how we could have made your experience a very good one. Thank you.

## 2021-09-03 NOTE — PROGRESS NOTES
Khushi Echevarria (:  1995) is a 23 y.o. male,Established patient, here for evaluation of the following chief complaint(s): Medication Check (patient is doing well after starting Lexapro 10mg 3 weeks ago. denies any issues. )      SUBJECTIVE/OBJECTIVE:  HPI   The patient presents via virtual visit. He was seen in office 2021 for symptoms of anxiety. He was initiated on Lexapro 10 mg. He states he is feeling \"great\". He states that he is not have worrying symptoms. He is sleeping well. Denies side effects of the medication. He denies concerns or questions today. Review of Systems   Admits improving anxiety symptoms  Admits normal sleeping patterns    No flowsheet data found. Physical Exam  Constitutional:       General: He is not in acute distress. Appearance: Normal appearance. He is not ill-appearing or toxic-appearing. Neurological:      Mental Status: He is alert and oriented to person, place, and time. Psychiatric:         Mood and Affect: Mood normal.         Behavior: Behavior normal.         Thought Content: Thought content normal.         Judgment: Judgment normal.       ASSESSMENT/PLAN:   Diagnosis Orders   1. Anxiety         PLAN:  -Symptoms stable and well-controlled on Lexapro 10 mg. Will provide refill today. -Reviewed side effects of Lexapro and when to notify office.  -Notify office with any concerns.  -Follow-up 6 months    Dread Allen, was evaluated through a synchronous (real-time) audio-video encounter. The patient (or guardian if applicable) is aware that this is a billable service. Verbal consent to proceed has been obtained within the past 12 months. The visit was conducted pursuant to the emergency declaration under the [address] [facility], [address] [place] [address] authority and the [employer] General Act. Patient identification was verified, and a caregiver was present when appropriate.  The patient was located in a state where the provider was credentialed to provide care. An electronic signature was used to authenticate this note.     --KAMRYN Treadwell - CNP

## 2021-12-21 ENCOUNTER — OFFICE VISIT (OUTPATIENT)
Dept: FAMILY MEDICINE CLINIC | Age: 26
End: 2021-12-21
Payer: COMMERCIAL

## 2021-12-21 VITALS
WEIGHT: 208 LBS | SYSTOLIC BLOOD PRESSURE: 118 MMHG | DIASTOLIC BLOOD PRESSURE: 68 MMHG | HEIGHT: 77 IN | BODY MASS INDEX: 24.56 KG/M2

## 2021-12-21 DIAGNOSIS — L23.9 ALLERGIC CONTACT DERMATITIS, UNSPECIFIED TRIGGER: Primary | ICD-10-CM

## 2021-12-21 PROCEDURE — 96372 THER/PROPH/DIAG INJ SC/IM: CPT | Performed by: FAMILY MEDICINE

## 2021-12-21 PROCEDURE — 99213 OFFICE O/P EST LOW 20 MIN: CPT | Performed by: FAMILY MEDICINE

## 2021-12-21 RX ORDER — TRIAMCINOLONE ACETONIDE 40 MG/ML
40 INJECTION, SUSPENSION INTRA-ARTICULAR; INTRAMUSCULAR ONCE
Status: COMPLETED | OUTPATIENT
Start: 2021-12-21 | End: 2021-12-21

## 2021-12-21 RX ADMIN — TRIAMCINOLONE ACETONIDE 40 MG: 40 INJECTION, SUSPENSION INTRA-ARTICULAR; INTRAMUSCULAR at 16:04

## 2021-12-21 NOTE — PROGRESS NOTES
ELVIS, Tammi Degree, RMA, am scribing for and in the presence of Dr. Maira Navarrete. 12/21/21/3:50/CRF      [address] [address]  [place] [address]  Dept: [phone]    HPI:  Pt presents for what he believes is a allergic reaction started Sunday 12/19/21     Current Outpatient Medications   Medication Sig Dispense Refill    escitalopram (LEXAPRO) 10 MG tablet Take 1 tablet by mouth daily 90 tablet 3    atenolol (TENORMIN) 50 MG tablet Take 1 tablet by mouth daily 90 tablet 3     No current facility-administered medications for this visit. ROS:  Admits swelling R side of face and ear  Admits itching in ear , and R eye    EXAM:  PHYSICAL EXAM:  General Appearance: in no acute distress, well developed, well nourished. Eyes: pupils equal, round reactive to light and accommodation. Ears: normal canal and TM's. R ear swollen warm to the touch ,  skin overlying mastoid swollen   Nose: nares patent, no lesions. Fine papular rash long bridge of nose   Oral Cavity: mucosa moist.  Throat: clear. Neck/Thyroid: neck supple, full range of motion, no cervical lymphadenopathy, no thyromegaly   Skin: warm and dry. No suspicious lesions. Heart: regular rate and rhythm. No murmurs. S1, S2 normal, no gallops. Lungs: clear to auscultation bilateral  Psych: normal affect, speech fluent. /68   Ht 6' 5\" (1.956 m)   Wt 208 lb (94.3 kg)   BMI 24.67 kg/m²   Wt Readings from Last 3 Encounters:   12/21/21 208 lb (94.3 kg)   08/13/21 197 lb (89.4 kg)   07/07/21 204 lb 12.8 oz (92.9 kg)     BP Readings from Last 3 Encounters:   12/21/21 118/68   08/13/21 120/65   07/07/21 120/72         ASSESSMENT:   Diagnosis Orders   1. Allergic contact dermatitis, unspecified trigger  triamcinolone acetonide (KENALOG-40) injection 40 mg    right side of face         PLAN:  I examine the Right side of his face I suspect contact dermatis I will treat with Kenalog.      No orders of the defined

## 2022-01-11 ENCOUNTER — HOSPITAL ENCOUNTER (OUTPATIENT)
Age: 27
Setting detail: SPECIMEN
Discharge: HOME | End: 2022-01-11
Payer: COMMERCIAL

## 2022-01-11 ENCOUNTER — OFFICE VISIT (OUTPATIENT)
Dept: PRIMARY CARE CLINIC | Age: 27
End: 2022-01-11
Payer: COMMERCIAL

## 2022-01-11 VITALS
WEIGHT: 208.8 LBS | RESPIRATION RATE: 20 BRPM | BODY MASS INDEX: 24.76 KG/M2 | HEART RATE: 82 BPM | SYSTOLIC BLOOD PRESSURE: 116 MMHG | DIASTOLIC BLOOD PRESSURE: 68 MMHG | TEMPERATURE: 99.1 F

## 2022-01-11 DIAGNOSIS — R10.9 FLANK PAIN: ICD-10-CM

## 2022-01-11 DIAGNOSIS — R10.9 FLANK PAIN: Primary | ICD-10-CM

## 2022-01-11 LAB
BILIRUBIN, POC: NORMAL
BLOOD URINE, POC: NORMAL
CLARITY, POC: CLEAR
COLOR, POC: YELLOW
GLUCOSE URINE, POC: NORMAL
KETONES, POC: NORMAL
LEUKOCYTE EST, POC: NORMAL
NITRITE, POC: NORMAL
PH, POC: 7
PROTEIN, POC: NORMAL
SPECIFIC GRAVITY, POC: 1.01
UROBILINOGEN, POC: 0.2

## 2022-01-11 PROCEDURE — 99213 OFFICE O/P EST LOW 20 MIN: CPT | Performed by: NURSE PRACTITIONER

## 2022-01-11 PROCEDURE — 87086 URINE CULTURE/COLONY COUNT: CPT

## 2022-01-11 PROCEDURE — 81003 URINALYSIS AUTO W/O SCOPE: CPT | Performed by: NURSE PRACTITIONER

## 2022-01-11 ASSESSMENT — ENCOUNTER SYMPTOMS
RESPIRATORY NEGATIVE: 1
ALLERGIC/IMMUNOLOGIC NEGATIVE: 1
COUGH: 0
RHINORRHEA: 0
EYES NEGATIVE: 1
GASTROINTESTINAL NEGATIVE: 1

## 2022-01-11 NOTE — PROGRESS NOTES
[facility]YSIC[facility]  Omar Causey CNP  [facility] WALK-IN  [address] [facility] [address] [facility]  Dept: [phone]  Dept Fax: [phone]    Neda Ennis is a 23 y.o. male who presents to the [facility] in Care today for his medical conditions/complaints as noted below. Neda Ennis is c/o of Other (c/o left flank pain, started 2 days ago- worse with deep breath, had fever this am)      HPI:    Nead Ennis is a 23 y.o. male who presents with  Left sided lower back pain/flank pain. He states that when he takes a deep breath has increased pain. Denies pain with coughing but states he has pain with sneezing. He had a 99.8 temperature at home this morning and took tylenol that brought temperature down. Denies cough, congestion, or sore throat. He had sweats this morning. He denies any known injury to the area. Denies dysuria. He has no history of kidney stones. He states when he woke up this morning he had increased pain. He states while sitting he has mild \"soreness\"  In the left flank area. Past Medical History:   Diagnosis Date    H/O echocardiogram 06/20/2005    Marfan's syndrome         Current Outpatient Medications   Medication Sig Dispense Refill    escitalopram (LEXAPRO) 10 MG tablet Take 1 tablet by mouth daily 90 tablet 3    atenolol (TENORMIN) 50 MG tablet Take 1 tablet by mouth daily 90 tablet 3     No current facility-administered medications for this visit. No Known Allergies    Subjective:      Review of Systems   Constitutional: Positive for chills, diaphoresis and fever. Negative for appetite change and fatigue. HENT: Negative. Negative for rhinorrhea. Eyes: Negative. Respiratory: Negative. Negative for cough. Gastrointestinal: Negative. Endocrine: Negative. Genitourinary: Positive for flank pain (left sided). Negative for dysuria. Skin: Negative. Allergic/Immunologic: Negative. Neurological: Negative. Hematological: Negative. Psychiatric/Behavioral: Negative. Objective:     Physical Exam  Vitals and nursing note reviewed. Constitutional:       Appearance: Normal appearance. HENT:      Head: Normocephalic. Right Ear: External ear normal.      Left Ear: External ear normal.      Nose: Nose normal.      Mouth/Throat:      Mouth: Mucous membranes are moist.      Pharynx: Oropharynx is clear. Eyes:      Conjunctiva/sclera: Conjunctivae normal.      Pupils: Pupils are equal, round, and reactive to light. Cardiovascular:      Rate and Rhythm: Normal rate and regular rhythm. Heart sounds: Normal heart sounds. Pulmonary:      Effort: Pulmonary effort is normal.      Breath sounds: Normal breath sounds. Abdominal:      General: Bowel sounds are normal.      Palpations: Abdomen is soft. Tenderness: There is left CVA tenderness. There is no right CVA tenderness. Musculoskeletal:         General: Normal range of motion. Cervical back: Normal range of motion. Skin:     General: Skin is warm. Capillary Refill: Capillary refill takes less than 2 seconds. Neurological:      General: No focal deficit present. Mental Status: He is alert and oriented to person, place, and time. /68 (Site: Left Upper Arm, Position: Sitting, Cuff Size: Large Adult)   Pulse 82   Temp 99.1 °F (37.3 °C) (Temporal)   Resp 20   Wt 208 lb 12.8 oz (94.7 kg)   BMI 24.76 kg/m²     Assessment:      Diagnosis Orders   1.  Flank pain  POCT Urinalysis No Micro (Auto)     Results for orders placed or performed in visit on 01/11/22   POCT Urinalysis No Micro (Auto)   Result Value Ref Range    Color, UA yellow     Clarity, UA clear     Glucose, UA POC neg     Bilirubin, UA neg     Ketones, UA neg     Spec Grav, UA 1.010     Blood, UA POC neg     pH, UA 7.0     Protein, UA POC neg     Urobilinogen, UA 0.2     Leukocytes, UA neg     Nitrite, UA neg        Plan:     Urine Culture sent and will call with results. CT scan to rule out kidney stone. Will call with results. Increase fluids and rest.  Tylenol or Ibuprofen for pain and discomfort. Go to Emergency Room if increased pain. No follow-ups on file. No orders of the defined types were placed in this encounter.        Electronically signed by KAMRYN Branch CNP on 1/11/2022 at 10:16 AM

## 2022-01-11 NOTE — PATIENT INSTRUCTIONS
Patient Education        Flank Pain: Care Instructions  Your Care Instructions  Flank pain is pain on the side of the back just below the rib cage and above the waist. It can be on one or both sides. Flank pain has many possible causes, including a kidney stone, a urinary tract infection, or back strain. Flank pain may get better on its own. But don't ignore new symptoms, such as fever, nausea and vomiting, urination problems, pain that gets worse, and dizziness. These may be signs of a more serious problem. You may have to have tests or other treatment. Follow-up care is a key part of your treatment and safety. Be sure to make and go to all appointments, and call your doctor if you are having problems. It's also a good idea to know your test results and keep a list of the medicines you take. How can you care for yourself at home? · Rest until you feel better. · Take pain medicines exactly as directed. ? If the doctor gave you a prescription medicine for pain, take it as prescribed. ? If you are not taking a prescription pain medicine, ask your doctor if you can take an over-the-counter pain medicine, such as acetaminophen (Tylenol), ibuprofen (Advil, Motrin), or naproxen (Aleve). Read and follow all instructions on the label. · If your doctor prescribed antibiotics, take them as directed. Do not stop taking them just because you feel better. You need to take the full course of antibiotics. · To apply heat, put a warm water bottle, a heating pad set on low, or a warm cloth on the painful area. Do not go to sleep with a heating pad on your skin. · To prevent dehydration, drink plenty of fluids. Choose water and other clear liquids until you feel better. If you have kidney, heart, or liver disease and have to limit fluids, talk with your doctor before you increase the amount of fluids you drink. When should you call for help?    Call your doctor now or seek immediate medical care if:    · Your flank pain gets worse.     · You have new symptoms, such as fever, nausea, or vomiting.     · You have symptoms of a urinary problem. For example:  ? You have blood or pus in your urine. ? You have chills or body aches. ? It hurts to urinate. ? You have groin or belly pain. Watch closely for changes in your health, and be sure to contact your doctor if you do not get better as expected. Where can you learn more? Go to [email or web address]://[email or web address]. org and sign in to your [employer] account. Enter S191 in the [employer] box to learn more about \"Flank Pain: Care Instructions. \"     If you do not have an account, please click on the \"Sign Up Now\" link. Current as of: July 1, 2021               Content Version: 13.1  © 5046-7435 Healthwise, Incorporated. Care instructions adapted under license by [facility] ([facility]). If you have questions about a medical condition or this instruction, always ask your healthcare professional. [place] [address] any warranty or liability for your use of this information.

## 2022-01-11 NOTE — LETTER
[address]  [address]  Phone: [phone]  Fax: KAMRYN Rojo CNP        January 11, 2022     Patient: Apryl Lee   YOB: 1995   Date of Visit: 1/11/2022       To Whom it May Concern:    Dread Fitchpriti Marin was seen in my clinic on 1/11/2022. He may return to work on 1/12/2022. If you have any questions or concerns, please don't hesitate to call.     Sincerely,         KAMRYN Richardson CNP

## 2022-01-12 LAB
CULTURE: NO GROWTH
Lab: NORMAL
SPECIMEN DESCRIPTION: NORMAL

## 2022-01-13 ENCOUNTER — TELEPHONE (OUTPATIENT)
Dept: PRIMARY CARE CLINIC | Age: 27
End: 2022-01-13

## 2022-01-13 NOTE — TELEPHONE ENCOUNTER
LVM to patient in regards to normal urine culture results. Waiting for CT scan to be done. Patient to call the office with any questions.

## 2022-01-13 NOTE — TELEPHONE ENCOUNTER
----- Message from KAMRYN Ramirez CNP sent at 1/12/2022  1:50 PM EST -----  Please notify patient of normal urine culture results. Ask him if he got his CT scan done?   Thanks [facility]

## 2022-08-11 ENCOUNTER — TELEPHONE (OUTPATIENT)
Dept: PRIMARY CARE CLINIC | Age: 27
End: 2022-08-11

## 2022-08-11 DIAGNOSIS — Z00.00 ROUTINE GENERAL MEDICAL EXAMINATION AT A HEALTH CARE FACILITY: ICD-10-CM

## 2022-08-11 RX ORDER — ATENOLOL 50 MG/1
50 TABLET ORAL DAILY
Qty: 30 TABLET | Refills: 0 | Status: SHIPPED | OUTPATIENT
Start: 2022-08-11 | End: 2022-08-19 | Stop reason: SDUPTHER

## 2022-08-11 RX ORDER — ESCITALOPRAM OXALATE 10 MG/1
10 TABLET ORAL DAILY
Qty: 30 TABLET | Refills: 0 | Status: SHIPPED | OUTPATIENT
Start: 2022-08-11 | End: 2022-08-19 | Stop reason: SDUPTHER

## 2022-08-11 NOTE — TELEPHONE ENCOUNTER
Patient is completely out of medications and is requesting a refill. The earliest appointment I could get him for a new patient was for next Friday? Can it be filled for a one week supply until he is seen?

## 2022-08-19 ENCOUNTER — OFFICE VISIT (OUTPATIENT)
Dept: PRIMARY CARE CLINIC | Age: 27
End: 2022-08-19
Payer: COMMERCIAL

## 2022-08-19 VITALS
HEART RATE: 67 BPM | HEIGHT: 77 IN | WEIGHT: 212 LBS | BODY MASS INDEX: 25.03 KG/M2 | DIASTOLIC BLOOD PRESSURE: 74 MMHG | OXYGEN SATURATION: 99 % | SYSTOLIC BLOOD PRESSURE: 106 MMHG

## 2022-08-19 DIAGNOSIS — Q87.40 MARFAN'S SYNDROME: ICD-10-CM

## 2022-08-19 DIAGNOSIS — F41.9 ANXIETY: Primary | ICD-10-CM

## 2022-08-19 PROCEDURE — 99214 OFFICE O/P EST MOD 30 MIN: CPT | Performed by: STUDENT IN AN ORGANIZED HEALTH CARE EDUCATION/TRAINING PROGRAM

## 2022-08-19 RX ORDER — ATENOLOL 50 MG/1
50 TABLET ORAL DAILY
Qty: 90 TABLET | Refills: 3 | Status: SHIPPED | OUTPATIENT
Start: 2022-08-19

## 2022-08-19 RX ORDER — ESCITALOPRAM OXALATE 10 MG/1
10 TABLET ORAL DAILY
Qty: 90 TABLET | Refills: 3 | Status: SHIPPED | OUTPATIENT
Start: 2022-08-19 | End: 2022-09-27 | Stop reason: SDUPTHER

## 2022-08-19 SDOH — ECONOMIC STABILITY: FOOD INSECURITY: WITHIN THE PAST 12 MONTHS, YOU WORRIED THAT YOUR FOOD WOULD RUN OUT BEFORE YOU GOT MONEY TO BUY MORE.: NEVER TRUE

## 2022-08-19 SDOH — ECONOMIC STABILITY: FOOD INSECURITY: WITHIN THE PAST 12 MONTHS, THE FOOD YOU BOUGHT JUST DIDN'T LAST AND YOU DIDN'T HAVE MONEY TO GET MORE.: NEVER TRUE

## 2022-08-19 ASSESSMENT — PATIENT HEALTH QUESTIONNAIRE - PHQ9
SUM OF ALL RESPONSES TO PHQ QUESTIONS 1-9: 0
2. FEELING DOWN, DEPRESSED OR HOPELESS: 0
SUM OF ALL RESPONSES TO PHQ QUESTIONS 1-9: 0
SUM OF ALL RESPONSES TO PHQ9 QUESTIONS 1 & 2: 0
1. LITTLE INTEREST OR PLEASURE IN DOING THINGS: 0

## 2022-08-19 ASSESSMENT — SOCIAL DETERMINANTS OF HEALTH (SDOH): HOW HARD IS IT FOR YOU TO PAY FOR THE VERY BASICS LIKE FOOD, HOUSING, MEDICAL CARE, AND HEATING?: NOT HARD AT ALL

## 2022-08-19 NOTE — PROGRESS NOTES
[address], [address] , [facility], [address]    Cathy Adamson is a 33 y.o. male with  has a past medical history of H/O echocardiogram and Marfan's syndrome. Presented to the office today for:  Chief Complaint   Patient presents with    Establish Care     Denies any concerns        Assessment/Plan   1. Anxiety  -     escitalopram (LEXAPRO) 10 MG tablet; Take 1 tablet by mouth daily, Disp-90 tablet, R-3Normal  2. Marfan's syndrome  -     atenolol (TENORMIN) 50 MG tablet; Take 1 tablet by mouth daily, Disp-90 tablet, R-3Please consider 90 day supplies to promote better adherenceNormal    Medications: Lexapro 10mg PO daily to be continued at this time. Reviewed concept of anxiety as biochemical imbalance of neurotransmitters and rationale for treatment. Instructed patient to contact office or on-call physician promptly should condition worsen or any new symptoms appear and provided on-call telephone numbers. IF THE PATIENT HAS ANY SUICIDAL OR HOMICIDAL IDEATIONS, CALL THE OFFICE, DISCUSS WITH A SUPPORT MEMBER, OR GO TO THE ER IMMEDIATELY. Patient was agreeable with this plan. Marfan's like syndrome - continue w/ Atenolol at this time, per prior Cardiology recommendations. He is currently in the process of identifying/differentiating between a Dx of Marfan vs. Loeys-Karely syndrome. . Notes from Genetics on 7/27/2022 were reviewed. I discussed with him that we should obtain a repeat ECHO (pt would like a repeat ECHO next year). We may also consider, depending on the Dx a CTA/MRA head/pelvis to evaluate for any aortic root or heart valve function. I also encouraged him to have an Ophthalmologic consultation/eye exam soon. The patient will also update us and let us know any of his  teams' recommendations for any further testing/monitoring of his current medical condition.     Return in about 1 year (around 8/19/2023), or if symptoms worsen or fail to improve, for F/U Atenolol/Lexapro. All patient questions answered. Pt voiced understanding. Medications Discontinued During This Encounter   Medication Reason    atenolol (TENORMIN) 50 MG tablet REORDER    escitalopram (LEXAPRO) 10 MG tablet REORDER       Patient received counseling on the following healthy behaviors: nutrition, exercise and medication adherence. I encouraged and discussed lifestyle modifications including diet and exercise and the patient was agreeable to making positive/beneficial changes to both to help improve their overall health. Discussed use, benefit, and side effects of prescribed medications. Barriers to medication compliance addressed. Patient given educational materials: see patient instructions. HM - HM items completed today as per orders. Outstanding HM items though not limited to immunizations were discussed with the patient today, including risks, benefits and alternatives. The patient will discuss these during the next appointment per their preference. If there are any worsening or concerning signs or symptoms, patient will report to the ED and/or contact [id number] for immediate evaluation. Teach back method was used. Subjective:    HPI  Chief Complaint   Patient presents with    Establish Care     Denies any concerns      Enlarged Aorta/Hx of Marfan Like Syndrome. The patient notes that he had been diagnosed with Marfan's syndrome years ago. This diagnosis was placed years ago given clinical status and then re-evaluated recently given some additional family /genetics testing in his other family members. He is getting [facility]. He has been taking Atenolol for many years now, which he states is not for a history of blood pressure, but mostly for a history noted for an enlarged Aorta. He had last been evaluated by Cardiology in 2017. He last completed an ECHO in 07/2021.  Patient denies chest pain, chest pressure/discomfort, claudication, dyspnea, exertional chest pressure/discomfort, fatigue, irregular heart beat, lower extremity edema, near-syncope, orthopnea, palpitations, paroxysmal nocturnal dyspnea, syncope, and tachypnea. ECHO 07/20/2021    \"  CONCLUSIONS     Summary  Global left ventricular systolic function appears preserved with an  estimated ejection fraction of 50-55%. Normal left ventricular wall thickness with a mildly increased left  ventricular cavity size. No definite specific wall motion abnormalities were identified. Normal tricuspid valve structure with mild tricuspid regurgitation. The aortic root is mildly dilated when corrected for body surface area. No clear evidence of diastolic dysfunction was identified. Compared to the previous study of 8/16/19, no significant change was seen. Signature  ----------------------------------------------------------------------------   Electronically signed by Caty Lan(Sonographer) on 07/20/2021 04:12 PM  \"        Stephania iKrk is a 33 y.o. male who presents for follow up of anxiety disorder. He has the following anxiety symptoms: chest pain, difficulty concentrating, dizziness, fatigue, feelings of losing control, insomnia, irritable, palpitations, panic attacks, paresthesias, psychomotor agitation, racing thoughts, shortness of breath, and sweating. Onset of symptoms was approximately about 1 year ago. Symptoms have been controlled since that time. He denies current suicidal and homicidal ideation. Risk factors: negative life events. Previous treatment includes Lexapro 10mg. He complains of the following medication side effects: none.     Health Maintenance -   Alcohol/Substance use History - None    Tobacco Use      Smoking status: Never      Smokeless tobacco: Never    Family History   Problem Relation Age of Onset    Marfan Syndrome Mother     Pacemaker Father     Marfan Syndrome Sister     Heart Defect Sister        [facility] Scores 8/19/2022 8/13/2021 7/7/2021 7/1/2020 7/12/2019   PHQ2 Score 0 0 0 0 0   PHQ9 Score 0 0 0 0 0     Interpretation of Total Score Depression Severity: 1-4 = Minimal depression, 5-9 = Mild depression, 10-14 = Moderate depression, 15-19 = Moderately severe depression, 20-27 = Severe depression    Review of Systems  Constitutional: Negative for activity change, appetite change, chills, diaphoresis, fatigue, fever and unexpected weight change. HENT: Negative for sinus pressure, sinus pain, sore throat and trouble swallowing. Respiratory: Negative for cough, shortness of breath and wheezing. Cardiovascular: Negative for chest pain, palpitations and leg swelling. Gastrointestinal: Negative for abdominal pain, diarrhea, nausea and vomiting. Endocrine: Negative for cold intolerance, polydipsia, polyphagia and polyuria. Genitourinary: Negative for difficulty urinating, flank pain and frequency. Musculoskeletal: Negative for gait problem and joint swelling. Negative for back pain, neck pain and neck stiffness. Skin: Negative for color change and wound. Negative for pallor and rash. Allergic/Immunologic: Negative for environmental allergies and food allergies. Neurological: Negative for light-headedness, numbness and headaches. Psychiatric/Behavioral: Negative for sleep disturbance. Negative for confusion and suicidal ideas. Positive for hx of anxiety. Objective:    /74   Pulse 67   Ht 6' 5\" (1.956 m)   Wt 212 lb (96.2 kg)   SpO2 99%   BMI 25.14 kg/m²    BP Readings from Last 3 Encounters:   08/19/22 106/74   01/11/22 116/68   12/21/21 118/68     Physical Exam  Constitutional: Patient is oriented to person, place, and time. Patient appears well-developed and well-nourished. No distress. HENT: Head: Normocephalic and atraumatic. Eyes: Pupils are equal, round, and reactive to light. Conjunctivae are normal. Right eye exhibits no discharge. Left eye exhibits no discharge.    Cardiovascular: Normal rate, regular rhythm and abnormal heart sounds, murmur present. Minimal pectus present. Pulmonary/Chest: Effort normal and breath sounds normal. No respiratory distress. Patient has no wheezes. Abdominal: Soft. Bowel sounds are normal. Patient exhibits no distension. There is no tenderness. Musculoskeletal:  Patient exhibits no edema and tenderness. Patient exhibits no deformity. Hypermobility of the thumb joint. Neurological: Patient is alert and oriented to person, place, and time. Skin: Skin is warm and dry. Patient is not diaphoretic. Psychiatric: Patient's speech is normal and behavior is normal. Thought content normal.   Mental Status: appearance:  appropriately dressed and healthy looking, behavior:  normal, attitude:  cooperative, speech:  appropriate, mood:  euthymic, affect:  congruent with mood, thought content:  no evidence of psychosis, thought process:  logical and coherent, orientation:  oriented in all spheres, memory:  recent:  good and remote:  good, insight:  good , judgment:  good , and cognitive:  intact  Vitals reviewed. Lab Results   Component Value Date    WBC 5.2 07/07/2021    HGB 15.4 07/07/2021    HCT 45.4 07/07/2021     07/07/2021    CHOL 158 07/07/2021    TRIG 100 07/07/2021    HDL 47 07/07/2021    ALT 17 07/07/2021    AST 17 07/07/2021     07/07/2021    K 4.7 07/07/2021     07/07/2021    CREATININE 1.00 07/07/2021    BUN 14 07/07/2021    CO2 26 07/07/2021     Lab Results   Component Value Date    CALCIUM 9.7 07/07/2021     Lab Results   Component Value Date    LDLCHOLESTEROL 91 07/07/2021       Please note that this chart was generated using voice recognition Dragon dictation software. Although every effort was made to ensure the accuracy of this automated transcription, some errors in transcription may have occurred.     Electronically signed by Dr. Lima Feliciano MD on 8/19/2022 at 6:24 PM

## 2022-09-27 DIAGNOSIS — F41.9 ANXIETY: ICD-10-CM

## 2022-09-27 RX ORDER — ESCITALOPRAM OXALATE 10 MG/1
10 TABLET ORAL DAILY
Qty: 90 TABLET | Refills: 0 | Status: SHIPPED | OUTPATIENT
Start: 2022-09-27

## 2023-01-15 DIAGNOSIS — F41.9 ANXIETY: ICD-10-CM

## 2023-01-16 RX ORDER — ESCITALOPRAM OXALATE 10 MG/1
10 TABLET ORAL DAILY
Qty: 90 TABLET | Refills: 0 | Status: SHIPPED | OUTPATIENT
Start: 2023-01-16

## 2023-04-28 DIAGNOSIS — F41.9 ANXIETY: ICD-10-CM

## 2023-04-28 RX ORDER — ESCITALOPRAM OXALATE 10 MG/1
10 TABLET ORAL DAILY
Qty: 90 TABLET | Refills: 1 | Status: SHIPPED | OUTPATIENT
Start: 2023-04-28

## 2023-04-28 RX ORDER — ESCITALOPRAM OXALATE 10 MG/1
10 TABLET ORAL DAILY
Qty: 90 TABLET | Refills: 0 | OUTPATIENT
Start: 2023-04-28

## 2023-04-28 NOTE — TELEPHONE ENCOUNTER
Hello, yes thank you, please sched. F/u.   Electronically signed by Polina Simeon MD on 4/28/2023 at 10:04 AM

## 2023-08-17 ENCOUNTER — TELEPHONE (OUTPATIENT)
Dept: PRIMARY CARE CLINIC | Age: 28
End: 2023-08-17

## 2023-08-23 ENCOUNTER — OFFICE VISIT (OUTPATIENT)
Dept: PRIMARY CARE CLINIC | Age: 28
End: 2023-08-23
Payer: COMMERCIAL

## 2023-08-23 VITALS
OXYGEN SATURATION: 98 % | TEMPERATURE: 97.5 F | WEIGHT: 220 LBS | SYSTOLIC BLOOD PRESSURE: 104 MMHG | RESPIRATION RATE: 18 BRPM | BODY MASS INDEX: 25.98 KG/M2 | HEIGHT: 77 IN | HEART RATE: 61 BPM | DIASTOLIC BLOOD PRESSURE: 60 MMHG

## 2023-08-23 DIAGNOSIS — F41.9 ANXIETY: Primary | ICD-10-CM

## 2023-08-23 DIAGNOSIS — Z23 NEED FOR TDAP VACCINATION: ICD-10-CM

## 2023-08-23 PROCEDURE — 99213 OFFICE O/P EST LOW 20 MIN: CPT | Performed by: NURSE PRACTITIONER

## 2023-08-23 PROCEDURE — 90715 TDAP VACCINE 7 YRS/> IM: CPT | Performed by: NURSE PRACTITIONER

## 2023-08-23 PROCEDURE — 90471 IMMUNIZATION ADMIN: CPT | Performed by: NURSE PRACTITIONER

## 2023-08-23 RX ORDER — ESCITALOPRAM OXALATE 10 MG/1
10 TABLET ORAL DAILY
Qty: 90 TABLET | Refills: 3 | Status: SHIPPED | OUTPATIENT
Start: 2023-08-23

## 2023-08-23 NOTE — PROGRESS NOTES
Name: Dawson Quan  : 1995         Chief Complaint:     Chief Complaint   Patient presents with    Medication Check     Lexapro 10mg doing well on this dose. No concerns. History of Present Illness:      Dawson Quan is a 33 y.o.  male who presents with Medication Check (Lexapro 10mg doing well on this dose. No concerns.)      HPI    Anxiety:  Current treatment includes Lexapro 10 mg daily. He states he is doing well on this medication. He ran out of this medication for several days and noticed that his anxiety \"spiked\" without this. He states mood is well controlled. Denies concerns sleeping. Denies side effects. Denies low sad, thoughts thoughts. Denies thoughts of hurting self or others. Past Medical History:     Past Medical History:   Diagnosis Date    H/O echocardiogram 2005    Marfan's syndrome       Reviewed all health maintenance requirements and ordered appropriate tests  Health Maintenance Due   Topic Date Due    COVID-19 Vaccine (1) Never done    HIV screen  Never done    Hepatitis C screen  Never done    Flu vaccine (1) Never done       Past Surgical History:     No past surgical history on file. Medications:       Prior to Admission medications    Medication Sig Start Date End Date Taking? Authorizing Provider   escitalopram (LEXAPRO) 10 MG tablet Take 1 tablet by mouth daily 23  Yes KAMRYN Rodriguez - SHAHIDA   atenolol (TENORMIN) 50 MG tablet Take 1 tablet by mouth daily 22  Yes Amrit Torres MD        Allergies:       Patient has no known allergies. Social History:     Tobacco:    reports that he has never smoked. He has never used smokeless tobacco.  Alcohol:      reports that he does not currently use alcohol. Drug Use:  reports no history of drug use.     Family History:     Family History   Problem Relation Age of Onset    Marfan Syndrome Mother     Pacemaker Father     Marfan Syndrome Sister     Heart Defect Sister        Review of Systems:

## 2023-08-23 NOTE — PATIENT INSTRUCTIONS
SURVEY:    You may be receiving a survey from [employer] regarding your visit today. Please complete the survey to enable us to provide the highest quality of care to you and your family. If you cannot score us a very good on any question, please call the office to discuss how we could of made your experience a very good one. Thank you.

## 2023-10-16 DIAGNOSIS — Q87.40 MARFAN'S SYNDROME: ICD-10-CM

## 2023-10-16 RX ORDER — ATENOLOL 50 MG/1
50 TABLET ORAL DAILY
Qty: 90 TABLET | Refills: 3 | Status: SHIPPED | OUTPATIENT
Start: 2023-10-16

## 2023-10-31 ENCOUNTER — HOSPITAL ENCOUNTER (OUTPATIENT)
Age: 28
Discharge: HOME | End: 2023-10-31
Payer: COMMERCIAL

## 2023-10-31 ENCOUNTER — OFFICE VISIT (OUTPATIENT)
Dept: PRIMARY CARE CLINIC | Age: 28
End: 2023-10-31
Payer: COMMERCIAL

## 2023-10-31 VITALS
HEIGHT: 77 IN | TEMPERATURE: 97.6 F | RESPIRATION RATE: 18 BRPM | HEART RATE: 54 BPM | BODY MASS INDEX: 26.09 KG/M2 | DIASTOLIC BLOOD PRESSURE: 68 MMHG | SYSTOLIC BLOOD PRESSURE: 120 MMHG | WEIGHT: 221 LBS | OXYGEN SATURATION: 97 %

## 2023-10-31 DIAGNOSIS — G47.10 DAYTIME HYPERSOMNIA: ICD-10-CM

## 2023-10-31 DIAGNOSIS — R53.83 OTHER FATIGUE: Primary | ICD-10-CM

## 2023-10-31 DIAGNOSIS — R06.83 SNORING: ICD-10-CM

## 2023-10-31 DIAGNOSIS — R06.81 WITNESSED EPISODE OF APNEA: ICD-10-CM

## 2023-10-31 DIAGNOSIS — R53.83 OTHER FATIGUE: ICD-10-CM

## 2023-10-31 LAB
ANION GAP SERPL CALCULATED.3IONS-SCNC: 5 MMOL/L (ref 9–17)
BUN SERPL-MCNC: 13 MG/DL (ref 6–20)
BUN/CREAT SERPL: 16 (ref 9–20)
CALCIUM SERPL-MCNC: 9.5 MG/DL (ref 8.6–10.4)
CHLORIDE SERPL-SCNC: 103 MMOL/L (ref 98–107)
CO2 SERPL-SCNC: 30 MMOL/L (ref 20–31)
CREAT SERPL-MCNC: 0.8 MG/DL (ref 0.7–1.2)
ERYTHROCYTE [DISTWIDTH] IN BLOOD BY AUTOMATED COUNT: 13.2 % (ref 11.8–14.4)
GFR SERPL CREATININE-BSD FRML MDRD: >60 ML/MIN/1.73M2
GLUCOSE SERPL-MCNC: 89 MG/DL (ref 70–99)
HCT VFR BLD AUTO: 44.8 % (ref 40.7–50.3)
HGB BLD-MCNC: 15.3 G/DL (ref 13–17)
MCH RBC QN AUTO: 29.9 PG (ref 25.2–33.5)
MCHC RBC AUTO-ENTMCNC: 34.2 G/DL (ref 28.4–34.8)
MCV RBC AUTO: 87.5 FL (ref 82.6–102.9)
NRBC BLD-RTO: 0 PER 100 WBC
PLATELET # BLD AUTO: 205 K/UL (ref 138–453)
PMV BLD AUTO: 9.7 FL (ref 8.1–13.5)
POTASSIUM SERPL-SCNC: 4.3 MMOL/L (ref 3.7–5.3)
RBC # BLD AUTO: 5.12 M/UL (ref 4.21–5.77)
SODIUM SERPL-SCNC: 138 MMOL/L (ref 135–144)
TSH SERPL DL<=0.05 MIU/L-ACNC: 2.73 UIU/ML (ref 0.3–5)
VIT B12 SERPL-MCNC: 518 PG/ML (ref 232–1245)
WBC OTHER # BLD: 5.2 K/UL (ref 3.5–11.3)

## 2023-10-31 PROCEDURE — 99213 OFFICE O/P EST LOW 20 MIN: CPT | Performed by: NURSE PRACTITIONER

## 2023-10-31 PROCEDURE — 36415 COLL VENOUS BLD VENIPUNCTURE: CPT

## 2023-10-31 PROCEDURE — 84443 ASSAY THYROID STIM HORMONE: CPT

## 2023-10-31 PROCEDURE — 85027 COMPLETE CBC AUTOMATED: CPT

## 2023-10-31 PROCEDURE — 80048 BASIC METABOLIC PNL TOTAL CA: CPT

## 2023-10-31 PROCEDURE — 82607 VITAMIN B-12: CPT

## 2023-10-31 NOTE — PROGRESS NOTES
Name: Tori Franklin  : 1995         Chief Complaint:     Chief Complaint   Patient presents with    Fatigue     Stays sleeping well but always tired. Can easily fall asleep anywhere. History of Present Illness:      Tori Franklin is a 33 y.o.  male who presents with Fatigue (Stays sleeping well but always tired. Can easily fall asleep anywhere. )      HPI    Fatigue: The patient presents with concern of fatigue. He states he always feel tired and \"never feels rested\". He is sleeping 11pm - 6:15am nightly. He denies trouble falling or staying asleep. He has felt tired for \"years\". The patient notes that he has experienced apnea episodes himself. He does snore. He states he can \"fall asleep anywhere\". He is napping at work during lunch. He is taking lexapro with no concern. Mood is well managed on this medication. Past Medical History:     Past Medical History:   Diagnosis Date    H/O echocardiogram 2005    Marfan's syndrome       Reviewed all health maintenance requirements and ordered appropriate tests  Health Maintenance Due   Topic Date Due    COVID-19 Vaccine (1) Never done    HIV screen  Never done    Hepatitis C screen  Never done    Flu vaccine (1) Never done       Past Surgical History:     No past surgical history on file. Medications:       Prior to Admission medications    Medication Sig Start Date End Date Taking? Authorizing Provider   atenolol (TENORMIN) 50 MG tablet Take 1 tablet by mouth daily 10/16/23  Yes KAMRYN Almazan CNP   escitalopram (LEXAPRO) 10 MG tablet Take 1 tablet by mouth daily 23  Yes KAMRYN Almazan CNP        Allergies:       Patient has no known allergies. Social History:     Tobacco:    reports that he has never smoked. He has never used smokeless tobacco.  Alcohol:      reports that he does not currently use alcohol. Drug Use:  reports no history of drug use.     Family History:     Family History   Problem Relation Age of

## 2023-10-31 NOTE — PATIENT INSTRUCTIONS
SURVEY:    You may be receiving a survey from [employer] regarding your visit today. Please complete the survey to enable us to provide the highest quality of care to you and your family. If you cannot score us a very good on any question, please call the office to discuss how we could of made your experience a very good one. Thank you.

## 2023-11-06 ENCOUNTER — HOSPITAL ENCOUNTER (OUTPATIENT)
Dept: SLEEP CENTER | Age: 28
Discharge: HOME | End: 2023-11-06
Payer: COMMERCIAL

## 2023-11-06 DIAGNOSIS — R06.83 SNORING: ICD-10-CM

## 2023-11-06 DIAGNOSIS — G47.10 DAYTIME HYPERSOMNIA: ICD-10-CM

## 2023-11-06 DIAGNOSIS — R06.81 WITNESSED EPISODE OF APNEA: ICD-10-CM

## 2023-11-06 PROCEDURE — 95810 POLYSOM 6/> YRS 4/> PARAM: CPT

## 2023-11-06 ASSESSMENT — SLEEP AND FATIGUE QUESTIONNAIRES
HOW LIKELY ARE YOU TO NOD OFF OR FALL ASLEEP WHILE SITTING QUIETLY AFTER LUNCH WITHOUT ALCOHOL: 3
NUMBER OF TIMES YOU WAKE PER NIGHT: 0
WHAT TIME DO YOU USUALLY WAKE UP: 22500
HAVE YOU EVER NODDED OFF OR FALLEN ASLEEP WHILE DRIVING: YES
DOES YOUR SNORING BOTHER OTHERS: YES
ARE YOU TIRED AFTER SLEEPING: ALMOST DAILY
NORMAL AMOUNT OF SLEEP PER NIGHT: 7
HOW LIKELY ARE YOU TO NOD OFF OR FALL ASLEEP IN A CAR, WHILE STOPPED FOR A FEW MINUTES IN TRAFFIC: 1
FUNCTION BEST IN: MORNING
DO YOU HAVE HIGH BLOOD PRESSURE: NO
ARE YOU TIRED DURING WAKE TIME: ALMOST DAILY
HAS ANYONE NOTICED THAT YOU QUIT BREATHING DURING SLEEP: NEVER/ALMOST NEVER
HOW LIKELY ARE YOU TO NOD OFF OR FALL ASLEEP WHEN YOU ARE A PASSENGER IN A CAR FOR AN HOUR WITHOUT A BREAK: 3
HOW LIKELY ARE YOU TO NOD OFF OR FALL ASLEEP WHILE WATCHING TV: 1
SNORING VOLUME: LOUDER THAN TALKING
HOW MANY NAPS DO YOU TAKE PER WEEK: 0
HOW LIKELY ARE YOU TO NOD OFF OR FALL ASLEEP WHILE SITTING AND READING: 2
HOW OFTEN DO YOU SNORE: ALMOST DAILY
FOR THE FIRST 30 MINUTES AFTER WAKING, I AM: SOMEWHAT DROWSY
HOW OFTEN HAVE YOU NODDED OFF OR FALLEN ASLEEP WHILE DRIVING: 1-2 TIMES A MONTH
I SLEEP WELL: NO
DO YOU SNORE: YES
HOW LIKELY ARE YOU TO NOD OFF OR FALL ASLEEP WHILE LYING DOWN TO REST IN THE AFTERNOON WHEN CIRCUMSTANCES PERMIT: 3
WHAT TIME DO YOU USUALLY GO TO BED: 82800
ESS TOTAL SCORE: 15
HOW LIKELY ARE YOU TO NOD OFF OR FALL ASLEEP WHILE SITTING INACTIVE IN A PUBLIC PLACE: 1
USUAL AMOUNT OF TIME TO FALL ASLEEP (MIN): 15
HOW LIKELY ARE YOU TO NOD OFF OR FALL ASLEEP WHILE SITTING AND TALKING TO SOMEONE: 1

## 2023-11-07 VITALS — HEIGHT: 77 IN | BODY MASS INDEX: 26.09 KG/M2 | WEIGHT: 221 LBS

## 2023-11-15 LAB — STATUS: NORMAL

## 2023-11-20 ENCOUNTER — HOSPITAL ENCOUNTER (OUTPATIENT)
Dept: SLEEP CENTER | Age: 28
Discharge: HOME | End: 2023-11-20
Payer: COMMERCIAL

## 2023-11-20 DIAGNOSIS — R06.81 WITNESSED EPISODE OF APNEA: ICD-10-CM

## 2023-11-20 DIAGNOSIS — G47.10 DAYTIME HYPERSOMNIA: ICD-10-CM

## 2023-11-20 DIAGNOSIS — R06.83 SNORING: ICD-10-CM

## 2023-11-20 PROCEDURE — 95811 POLYSOM 6/>YRS CPAP 4/> PARM: CPT

## 2023-11-20 ASSESSMENT — SLEEP AND FATIGUE QUESTIONNAIRES
DO YOU SNORE: YES
ARE YOU TIRED DURING WAKE TIME: ALMOST DAILY
WHAT TIME DO YOU USUALLY WAKE UP: 22500
DO YOU HAVE HIGH BLOOD PRESSURE: NO
HAS ANYONE NOTICED THAT YOU QUIT BREATHING DURING SLEEP: 3-4 TIMES A WEEK
ARE YOU TIRED AFTER SLEEPING: ALMOST DAILY
FUNCTION BEST IN: MORNING
HAVE YOU EVER NODDED OFF OR FALLEN ASLEEP WHILE DRIVING: NO
HOW OFTEN DO YOU SNORE: 3-4 TIMES A WEEK
NUMBER OF TIMES YOU WAKE PER NIGHT: 0
WHAT TIME DO YOU USUALLY GO TO BED: 81000
SNORING VOLUME: AS LOUD AS TALKING
NORMAL AMOUNT OF SLEEP PER NIGHT: 7
USUAL AMOUNT OF TIME TO FALL ASLEEP (MIN): 5
HOW MANY NAPS DO YOU TAKE PER WEEK: 0
DOES YOUR SNORING BOTHER OTHERS: YES

## 2023-11-21 VITALS — HEIGHT: 77 IN | WEIGHT: 221 LBS | BODY MASS INDEX: 26.09 KG/M2

## 2023-11-21 LAB — STATUS: NORMAL

## 2023-11-21 NOTE — PROGRESS NOTES
Patient arrived for titration sleep study on 11/20/23 at 8pm. Patient was fitted with Valeria Van [place]ert full face size [facility]

## 2024-02-01 ENCOUNTER — OFFICE VISIT (OUTPATIENT)
Dept: PRIMARY CARE CLINIC | Age: 29
End: 2024-02-01

## 2024-02-01 VITALS
OXYGEN SATURATION: 97 % | BODY MASS INDEX: 27.16 KG/M2 | RESPIRATION RATE: 16 BRPM | HEART RATE: 88 BPM | DIASTOLIC BLOOD PRESSURE: 78 MMHG | HEIGHT: 77 IN | TEMPERATURE: 97.8 F | WEIGHT: 230 LBS | SYSTOLIC BLOOD PRESSURE: 117 MMHG

## 2024-02-01 DIAGNOSIS — Z23 NEED FOR INFLUENZA VACCINATION: ICD-10-CM

## 2024-02-01 DIAGNOSIS — G47.33 OSA (OBSTRUCTIVE SLEEP APNEA): Primary | ICD-10-CM

## 2024-02-01 ASSESSMENT — PATIENT HEALTH QUESTIONNAIRE - PHQ9
SUM OF ALL RESPONSES TO PHQ QUESTIONS 1-9: 0
SUM OF ALL RESPONSES TO PHQ9 QUESTIONS 1 & 2: 0
1. LITTLE INTEREST OR PLEASURE IN DOING THINGS: 0
2. FEELING DOWN, DEPRESSED OR HOPELESS: 0

## 2024-02-01 NOTE — PROGRESS NOTES
Name: Dread Smith  : 1995         Chief Complaint:     Chief Complaint   Patient presents with    Fatigue     -Patient is here for fatigue f/u.   -patient dx with sleep apnea.   -uses cpap nightly  -admits sleeping better nightly.        History of Present Illness:      Dread Smith is a 28 y.o.  male who presents with Fatigue (-Patient is here for fatigue f/u. /-patient dx with sleep apnea. /-uses cpap nightly/-admits sleeping better nightly. )      HPI    The patient presents for sleep apnea follow up. He underwent sleep study 2023 that was positive for MARY. He has been using CPAP machine since 2023. He admits using CPAP machine 7 nights per week. He states he is tolerating his machine well. He is sleeping better and feeling less fatigued throughout the day. He is averaging 7 nights per sleep.     Past Medical History:     Past Medical History:   Diagnosis Date    H/O echocardiogram 2005    Marfan's syndrome       Reviewed all health maintenance requirements and ordered appropriate tests  Health Maintenance Due   Topic Date Due    COVID-19 Vaccine (1) Never done    HIV screen  Never done    Hepatitis C screen  Never done       Past Surgical History:     No past surgical history on file.     Medications:       Prior to Admission medications    Medication Sig Start Date End Date Taking? Authorizing Provider   atenolol (TENORMIN) 50 MG tablet Take 1 tablet by mouth daily 10/16/23  Yes Norah De La Paz APRN - CNP   escitalopram (LEXAPRO) 10 MG tablet Take 1 tablet by mouth daily 23  Yes Norah De La Paz APRN - CNP        Allergies:       Patient has no known allergies.    Social History:     Tobacco:    reports that he has never smoked. He has never used smokeless tobacco.  Alcohol:      reports that he does not currently use alcohol.  Drug Use:  reports no history of drug use.    Family History:     Family History   Problem Relation Age of Onset    Marfan Syndrome Mother     Pacemaker

## 2024-04-09 ENCOUNTER — OFFICE VISIT (OUTPATIENT)
Dept: PRIMARY CARE CLINIC | Age: 29
End: 2024-04-09
Payer: COMMERCIAL

## 2024-04-09 VITALS
TEMPERATURE: 95.6 F | HEIGHT: 77 IN | OXYGEN SATURATION: 98 % | DIASTOLIC BLOOD PRESSURE: 70 MMHG | BODY MASS INDEX: 27.39 KG/M2 | HEART RATE: 80 BPM | WEIGHT: 232 LBS | SYSTOLIC BLOOD PRESSURE: 110 MMHG

## 2024-04-09 DIAGNOSIS — Z00.00 WELLNESS EXAMINATION: Primary | ICD-10-CM

## 2024-04-09 DIAGNOSIS — F41.9 ANXIETY: ICD-10-CM

## 2024-04-09 DIAGNOSIS — Q87.40 MARFAN SYNDROME: ICD-10-CM

## 2024-04-09 PROCEDURE — 99395 PREV VISIT EST AGE 18-39: CPT | Performed by: NURSE PRACTITIONER

## 2024-04-09 ASSESSMENT — ENCOUNTER SYMPTOMS
NAUSEA: 0
DIARRHEA: 0
SHORTNESS OF BREATH: 0

## 2024-04-09 NOTE — PROGRESS NOTES
Name: Dread Smith  : 1995         Chief Complaint:     Chief Complaint   Patient presents with    Annual Exam     Patient presents for a wellness exam. Patient denies concerns today. Patient admits well balanced diet. Patient admits regular exercise. Patient denies UTD routine eye exams. Patient admits UTD routine dental exams. Patient is not up to date on vaccinations for covid.        History of Present Illness:      Dread Smith is a 29 y.o.  male who presents with Annual Exam (Patient presents for a wellness exam. Patient denies concerns today. Patient admits well balanced diet. Patient admits regular exercise. Patient denies UTD routine eye exams. Patient admits UTD routine dental exams. Patient is not up to date on vaccinations for covid. )      HPI    Wellness:  He denies concerns today. He admits well balanced diet. For exercise he cuts wood and works outside daily. He denies routine eye exams. He admits routine eye exams. He is UTD flu and tetanus vaccine. He is not UTD covid vaccine. He denies family history of prostate or colorectal cancer.     Anxiety:  Current treatment includes lexapro 10mg. He denies side effects. Admits normal sleep.     MARY:  He is using CPAP machine 7 nights per week. He is less tired throughout the day since using his CPAP machine. If he does not wear this he can wake up with sore throat.     Marfan Syndrome:  Current treatment include atenolol 50mg QD. Denies chest pain, SOB, or palpitations. Most recent echo 2021.     Past Medical History:     Past Medical History:   Diagnosis Date    H/O echocardiogram 2005    Marfan's syndrome       Reviewed all health maintenance requirements and ordered appropriate tests  Health Maintenance Due   Topic Date Due    COVID-19 Vaccine (1) Never done    HIV screen  Never done    Hepatitis C screen  Never done       Past Surgical History:     No past surgical history on file.     Medications:       Prior to Admission

## 2024-10-13 DIAGNOSIS — F41.9 ANXIETY: ICD-10-CM

## 2024-10-14 RX ORDER — ESCITALOPRAM OXALATE 10 MG/1
10 TABLET ORAL DAILY
Qty: 90 TABLET | Refills: 3 | Status: SHIPPED | OUTPATIENT
Start: 2024-10-14

## 2024-10-22 DIAGNOSIS — Q87.40 MARFAN'S SYNDROME: ICD-10-CM

## 2024-10-22 RX ORDER — ATENOLOL 50 MG/1
50 TABLET ORAL DAILY
Qty: 90 TABLET | Refills: 3 | Status: SHIPPED | OUTPATIENT
Start: 2024-10-22